# Patient Record
Sex: MALE | Race: WHITE | Employment: OTHER | URBAN - METROPOLITAN AREA
[De-identification: names, ages, dates, MRNs, and addresses within clinical notes are randomized per-mention and may not be internally consistent; named-entity substitution may affect disease eponyms.]

---

## 2017-10-19 DIAGNOSIS — I25.810 CORONARY ARTERY DISEASE INVOLVING CORONARY BYPASS GRAFT OF NATIVE HEART WITHOUT ANGINA PECTORIS: Primary | ICD-10-CM

## 2017-10-20 ENCOUNTER — OFFICE VISIT (OUTPATIENT)
Dept: CARDIOLOGY | Facility: CLINIC | Age: 67
End: 2017-10-20
Payer: COMMERCIAL

## 2017-10-20 VITALS
HEIGHT: 69 IN | HEART RATE: 64 BPM | DIASTOLIC BLOOD PRESSURE: 72 MMHG | WEIGHT: 218.7 LBS | BODY MASS INDEX: 32.39 KG/M2 | SYSTOLIC BLOOD PRESSURE: 114 MMHG

## 2017-10-20 DIAGNOSIS — E78.2 MIXED HYPERLIPIDEMIA: ICD-10-CM

## 2017-10-20 DIAGNOSIS — E78.5 HYPERLIPIDEMIA LDL GOAL <100: ICD-10-CM

## 2017-10-20 DIAGNOSIS — Z95.1 S/P CABG (CORONARY ARTERY BYPASS GRAFT): ICD-10-CM

## 2017-10-20 DIAGNOSIS — I25.10 CORONARY ARTERY DISEASE INVOLVING NATIVE CORONARY ARTERY OF NATIVE HEART WITHOUT ANGINA PECTORIS: Primary | ICD-10-CM

## 2017-10-20 DIAGNOSIS — I25.10 CAD (CORONARY ARTERY DISEASE): ICD-10-CM

## 2017-10-20 PROCEDURE — 93000 ELECTROCARDIOGRAM COMPLETE: CPT | Performed by: INTERNAL MEDICINE

## 2017-10-20 PROCEDURE — 99213 OFFICE O/P EST LOW 20 MIN: CPT | Performed by: INTERNAL MEDICINE

## 2017-10-20 RX ORDER — ROSUVASTATIN CALCIUM 40 MG/1
40 TABLET, COATED ORAL DAILY
Qty: 90 TABLET | Refills: 3 | Status: SHIPPED | OUTPATIENT
Start: 2017-10-20 | End: 2018-11-30

## 2017-10-20 RX ORDER — METOPROLOL SUCCINATE 25 MG/1
25 TABLET, EXTENDED RELEASE ORAL 2 TIMES DAILY
Qty: 180 TABLET | Refills: 3 | Status: SHIPPED | OUTPATIENT
Start: 2017-10-20 | End: 2018-11-30

## 2017-10-20 NOTE — MR AVS SNAPSHOT
After Visit Summary   10/20/2017    Maico Smith    MRN: [id number]           Patient Information     Date Of Birth          1950        Visit Information        Provider Department      10/20/2017 9:15 AM Zachery Damon MD [facility] [place]        Today's Diagnoses     Coronary artery disease involving native coronary artery of native heart without angina pectoris    -  1    Hyperlipidemia LDL goal <100        Mixed hyperlipidemia        S/P CABG (coronary artery bypass graft)           Follow-ups after your visit        Additional Services     Follow-Up with Cardiologist                 Your next 10 appointments already scheduled     Oct 20, 2017 11:30 AM CDT   Ech Complete with [id number]   [facility] CV Echocardiography (Cardiovascular Imaging at [facility])    [address] [place] [id number] [phone]           1. Please bring or wear a comfortable two-piece outfit. 2. You may eat, drink and take your normal medicines. 3. For any questions that cannot be answered, please contact the ordering physician              Future tests that were ordered for you today     Open Future Orders        Priority Expected Expires Ordered    Follow-Up with Cardiologist Routine 10/20/2018 10/21/2018 10/20/2017    NM Exercise stress test (nuc card) Routine 10/27/2017 10/20/2018 10/20/2017    Echocardiogram Routine 10/26/2017 10/19/2018 10/19/2017            Who to contact     If you have questions or need follow up information about today's clinic visit or your schedule please contact [facility] [place] directly at [phone].  Normal or non-critical lab and imaging results will be communicated to you by MyChart, letter or phone within 4 business days after the clinic has received the results. If you do not hear from us within 7 days, please contact the clinic through MyChart or phone. If  "you have a critical or abnormal lab result, we will notify you by phone as soon as possible.  Submit refill requests through [employer] or call your pharmacy and they will forward the refill request to us. Please allow 3 business days for your refill to be completed.          Additional Information About Your Visit        [employer] Information     [employer] gives you secure access to your electronic health record. If you see a primary care provider, you can also send messages to your care team and make appointments. If you have questions, please call your primary care clinic.  If you do not have a primary care provider, please call [phone] and they will assist you.        Care EveryWhere ID     This is your Care EveryWhere ID. This could be used by other organizations to access your [place] medical records  [id number]        Your Vitals Were     Pulse Height BMI (Body Mass Index)             64 1.753 m (5' 9\") 32.3 kg/m2          Blood Pressure from Last 3 Encounters:   10/20/17 114/72   03/16/15 116/68   09/25/14 123/85    Weight from Last 3 Encounters:   10/20/17 99.2 kg (218 lb 11.2 oz)   03/16/15 97.3 kg (214 lb 9.6 oz)   09/25/14 99.8 kg (220 lb)              We Performed the Following     EKG 12-lead complete w/read - Clinics (performed today)          Today's Medication Changes          These changes are accurate as of: 10/20/17  9:55 AM.  If you have any questions, ask your nurse or doctor.               Stop taking these medicines if you haven't already. Please contact your care team if you have questions.     ezetimibe 10 MG tablet   Commonly known as:  ZETIA   Stopped by:  Zachery Damon MD                Where to get your medicines      These medications were sent to [facility] - [facility], [place] - [address]  [address], [facility] [address]     Phone:  [phone]     rosuvastatin 40 MG tablet                Primary Care Provider    Physician No Ref-Primary "       NO REF-PRIMARY PHYSICIAN        Equal Access to Services     EMERY OLIVARES : Hadii aad ku hadkaveho Somarlon, waaxda luqadaha, qaybta kaalmada shannon, nahomy bello. So [facility] [phone].    ATENCIÓN: Si habla español, tiene a gardiner disposición servicios gratuitos de asistencia lingüística. Jose al [phone].    We comply with applicable federal civil rights laws and [place] laws. We do not discriminate on the basis of race, color, national origin, age, disability, sex, sexual orientation, or gender identity.            Thank you!     Thank you for choosing [facility] AT [place]  for your care. Our goal is always to provide you with excellent care. Hearing back from our patients is one way we can continue to improve our services. Please take a few minutes to complete the written survey that you may receive in the mail after your visit with us. Thank you!             Your Updated Medication List - Protect others around you: Learn how to safely use, store and throw away your medicines at www.disposemymeds.org.          This list is accurate as of: 10/20/17  9:55 AM.  Always use your most recent med list.                   Brand Name Dispense Instructions for use Diagnosis    aspirin 81 MG tablet      1 TABLET DAILY    Other and unspecified hyperlipidemia, NSTEMI (non-ST elevated myocardial infarction) (H)       CALCIUM 500/D PO      Calcium magnesium zinc tablet, one daily    Other and unspecified hyperlipidemia, NSTEMI (non-ST elevated myocardial infarction) (H)       fish Oil 1200 MG capsule      2 tablets daily    Other and unspecified hyperlipidemia, NSTEMI (non-ST elevated myocardial infarction) (H)       ibuprofen 600 MG tablet    ADVIL/MOTRIN     1 TABLET 4 TIMES DAILY AS NEEDED    Other and unspecified hyperlipidemia, NSTEMI (non-ST elevated myocardial infarction) (H)       metoprolol 25 MG 24 hr tablet    TOPROL-XL    180 tablet    Take 1  tablet (25 mg) by mouth 2 times daily    CAD (coronary artery disease)       nitroGLYcerin 0.4 MG sublingual tablet    NITROSTAT    90 tablet    Take 1 tablet by mouth every 5 minutes as needed. up to 3 tablets per episode.    NSTEMI (non-ST elevated myocardial infarction) (H)       rosuvastatin 40 MG tablet    CRESTOR    90 tablet    Take 1 tablet (40 mg) by mouth daily    Coronary artery disease involving native coronary artery of native heart without angina pectoris       vitamin D 2000 UNITS Caps      Take  by mouth daily.    Other and unspecified hyperlipidemia, NSTEMI (non-ST elevated myocardial infarction) (H)       VITAMIN-B COMPLEX PO      Take  by mouth. 1 tablet daily    Routine general medical examination at a health care facility

## 2017-10-20 NOTE — PROGRESS NOTES
HPI and Plan:        I had the pleasure of seeing Maico Smith in Cardiology Clinic today, a 64-year-old male with past medical history of coronary disease with previous bypass surgery in 2009 who returns for a followup.  He had LIMA to the LAD, vein graft to the posterior descending artery and vein graft to the obtuse marginal branch.  He has done really well.  Denies any chest pain or shortness of breath.    His last stress test in 2014 showed no ischemia.  He moved to [place], and has not seen me since 2015. He is now on Medicare and is able to see me again and therefore wanted to establish care again.    He remains active in [place] but has had no chest pain. He did have an echocardiogram last year in [place] in 2016. I was not able to get the report to get everywhere. He tells me that the echocardiogram results were fine and there was discussed by his primary care physician with him. We'll try to open the results.    He used to be an rosuvastatin and Zetia but has discontinued both of them. Rosuvastatin was expensive as he was not able to get a non-generic brand and May. He wants to get back on rosuvastatin now and every feels that prescription today. I also asked him to see his primary care physician for lipid profile in December. If LDL is not adequately lowered by rosuvastatin, we may add Zetia.      PHYSICAL EXAMINATION:   VITAL SIGNS:  Blood pressure 114/72, pulse 64 per minute and regular.   CARDIAC:  Regular S1, S2 with no murmurs.   CHEST:  Clear to auscultation.       IMPRESSION:   1.  Coronary artery disease, stable with no angina.  I would continue aspirin, metoprolol XL 25 mg b.i.d. and some nitro on a p.r.n. basis.  I have encouraged him to continue exercise as able.     2.  Hyperlipidemia   Presuming rosuvastatin again. Side effects were discussed. He will make an appointment with his primary care physician in 2 months for repeat lipid profile and other labs.      Thank you for allowing  us to participate in the care of this nice patient.  We will see him back in followup in a year. I've recommended an exercise stress nuclear study on medications next year to assess ischemic burden of the myocardium.      Sincerely,           Zachery Damon MD         Orders Placed This Encounter   Procedures     NM Exercise stress test (nuc card)     Follow-Up with Cardiologist     EKG 12-lead complete w/read - Clinics (performed today)       Orders Placed This Encounter   Medications     rosuvastatin (CRESTOR) 40 MG tablet     Sig: Take 1 tablet (40 mg) by mouth daily     Dispense:  90 tablet     Refill:  3       Medications Discontinued During This Encounter   Medication Reason     ezetimibe (ZETIA) 10 MG tablet Cost/Formulary change     MULTIVITAMIN TABS   OR Stopped by Patient     rosuvastatin (CRESTOR) 40 MG tablet Reorder         Encounter Diagnoses   Name Primary?     Hyperlipidemia LDL goal <100      Coronary artery disease involving native coronary artery of native heart without angina pectoris Yes     Mixed hyperlipidemia      S/P CABG (coronary artery bypass graft)        CURRENT MEDICATIONS:  Current Outpatient Prescriptions   Medication Sig Dispense Refill     rosuvastatin (CRESTOR) 40 MG tablet Take 1 tablet (40 mg) by mouth daily 90 tablet 3     metoprolol (TOPROL-XL) 25 MG 24 hr tablet Take 1 tablet (25 mg) by mouth 2 times daily 180 tablet 3     nitroglycerin (NITROSTAT) 0.4 MG SL tablet Take 1 tablet by mouth every 5 minutes as needed. up to 3 tablets per episode. 90 tablet prn     B Complex Vitamins (VITAMIN-B COMPLEX PO) Take  by mouth. 1 tablet daily        FISH OIL 1200 MG OR CAPS 2 tablets daily       CALCIUM 500/D OR Calcium magnesium zinc tablet, one daily       Cholecalciferol (VITAMIN D) 2000 UNIT CAPS Take  by mouth daily.       ASPIRIN 81 MG OR TABS 1 TABLET DAILY       IBUPROFEN 600 MG OR TABS 1 TABLET 4 TIMES DAILY AS NEEDED       [DISCONTINUED] rosuvastatin (CRESTOR) 40 MG tablet Take  1 tablet (40 mg) by mouth daily 90 tablet 3       ALLERGIES   No Known Allergies    PAST MEDICAL HISTORY:  Past Medical History:   Diagnosis Date     Abnormal angiogram 2009     CAD (coronary artery disease) 2009     Hyperlipaemia 2009     Myocardial infarct 2009     Pleural effusion 2009    left side -drained     S/P CABG (coronary artery bypass graft) 2009       PAST SURGICAL HISTORY:  Past Surgical History:   Procedure Laterality Date     CORONARY ANGIOGRAPHY ADULT ORDER  9/14/09    Critical left main lesion with a thrombus sitting in the bifurcation of the LAD and the first septal .  Recommend  bypass grafting to the LAD, the left circumflex, and the PDA vessel.      CORONARY ARTERY BYPASS  2009     LIMA to the LAD and vein graft to the posterior descending artery and obtuse marginal branch       FAMILY HISTORY:  Family History   Problem Relation Age of Onset     Hypertension Mother      C.A.D. Mother      angioplasty     Cancer - colorectal Father      C.A.D. Father      CANCER Father      Bladder       SOCIAL HISTORY:  Social History     Social History     Marital status:      Spouse name: N/A     Number of children: N/A     Years of education: N/A     Social History Main Topics     Smoking status: Never Smoker     Smokeless tobacco: Never Used     Alcohol use Yes      Comment: 2 drinks week     Drug use: No     Sexual activity: Yes     Partners: Female     Other Topics Concern      Service No     Blood Transfusions No     Caffeine Concern No     Occupational Exposure No     Hobby Hazards No     Sleep Concern No     Stress Concern No     Weight Concern Yes     weight gain     Special Diet No     Back Care Yes     Exercise Yes     Walking 3-4x/week     Bike Helmet Not Asked     N/A     Seat Belt Yes     Parent/Sibling W/ Cabg, Mi Or Angioplasty Before 65f 55m? No     Social History Narrative       Review of Systems:  Skin:          Eyes:  Positive for glasses    ENT:  Negative     "  Respiratory:  Positive for sleep apnea waiting for new CPAP machine   Cardiovascular:  Negative;palpitations;chest pain;syncope or near-syncope;cyanosis;lightheadedness;dizziness;fatigue;edema;exercise intolerance      Gastroenterology: Negative      Genitourinary:  Negative      Musculoskeletal:  Negative      Neurologic:  Negative      Psychiatric:  Positive for sleep disturbances    Heme/Lymph/Imm:  Negative      Endocrine:  Negative        Physical Exam:  Vitals: /72 (BP Location: Left arm, Cuff Size: Adult Large)  Pulse 64  Ht 1.753 m (5' 9\")  Wt 99.2 kg (218 lb 11.2 oz)  BMI 32.3 kg/m2    Constitutional:  cooperative;alert and oriented        Skin:  warm and dry to the touch        Head:  no masses or lesions        Eyes:           ENT:  dentition good        Neck:  carotid pulses are full and equal bilaterally;JVP normal        Chest:  clear to auscultation          Cardiac: regular rhythm;normal S1 and S2     no presence of murmur            Abdomen:  abdomen soft;BS normoactive        Vascular: not assessed this visit                                        Extremities and Back:  no edema              Neurological:  no gross motor deficits              CC  No referring provider defined for this encounter.              "

## 2017-12-08 ENCOUNTER — TRANSFERRED RECORDS (OUTPATIENT)
Dept: HEALTH INFORMATION MANAGEMENT | Facility: CLINIC | Age: 67
End: 2017-12-08

## 2017-12-08 LAB
ALBUMIN SERPL-MCNC: 3.9 G/DL
ALP SERPL-CCNC: 71 U/L
ALT SERPL-CCNC: 20 U/L
ANION GAP SERPL CALCULATED.3IONS-SCNC: 7 MMOL/L
AST SERPL-CCNC: 24 U/L
BILIRUB SERPL-MCNC: 1.2 MG/DL
BUN SERPL-MCNC: 19 MG/DL
CALCIUM SERPL-MCNC: 9.3 MG/DL
CHLORIDE SERPLBLD-SCNC: 105 MMOL/L
CHOL/HDLC RATIO - QUEST: 3
CHOLEST SERPL-MCNC: 165 MG/DL
CO2 SERPL-SCNC: 26 MMOL/L
CREAT SERPL-MCNC: 1.11 MG/DL
GFR SERPL CREATININE-BSD FRML MDRD: 69 ML/MIN/1.73M2
GLUCOSE SERPL-MCNC: 95 MG/DL (ref 70–99)
HDLC SERPL-MCNC: 55 MG/DL
LDLC SERPL CALC-MCNC: 99 MG/DL
NONHDLC SERPL-MCNC: 110 MG/DL
POTASSIUM SERPL-SCNC: 3.8 MMOL/L
PROT SERPL-MCNC: 7 G/DL
SODIUM SERPL-SCNC: 138 MMOL/L
TRIGL SERPL-MCNC: 57 MG/DL

## 2017-12-28 DIAGNOSIS — E78.2 MIXED HYPERLIPIDEMIA: Primary | ICD-10-CM

## 2018-11-12 ENCOUNTER — HOSPITAL ENCOUNTER (OUTPATIENT)
Dept: CARDIOLOGY | Facility: CLINIC | Age: 68
Discharge: HOME | End: 2018-11-12
Attending: INTERNAL MEDICINE | Admitting: INTERNAL MEDICINE
Payer: COMMERCIAL

## 2018-11-12 DIAGNOSIS — I25.10 CORONARY ARTERY DISEASE INVOLVING NATIVE CORONARY ARTERY OF NATIVE HEART WITHOUT ANGINA PECTORIS: ICD-10-CM

## 2018-11-12 PROCEDURE — A9502 TC99M TETROFOSMIN: HCPCS | Performed by: INTERNAL MEDICINE

## 2018-11-12 PROCEDURE — 93018 CV STRESS TEST I&R ONLY: CPT | Performed by: INTERNAL MEDICINE

## 2018-11-12 PROCEDURE — 78452 HT MUSCLE IMAGE SPECT MULT: CPT | Mod: 26 | Performed by: INTERNAL MEDICINE

## 2018-11-12 PROCEDURE — 78452 HT MUSCLE IMAGE SPECT MULT: CPT

## 2018-11-12 PROCEDURE — 34300033 ZZH RX 343: Performed by: INTERNAL MEDICINE

## 2018-11-12 PROCEDURE — 93016 CV STRESS TEST SUPVJ ONLY: CPT | Performed by: INTERNAL MEDICINE

## 2018-11-12 RX ADMIN — TETROFOSMIN 3.93 MCI.: 1.38 INJECTION, POWDER, LYOPHILIZED, FOR SOLUTION INTRAVENOUS at 08:58

## 2018-11-12 RX ADMIN — TETROFOSMIN 12.56 MCI.: 1.38 INJECTION, POWDER, LYOPHILIZED, FOR SOLUTION INTRAVENOUS at 10:17

## 2018-11-14 ENCOUNTER — TELEPHONE (OUTPATIENT)
Dept: CARDIOLOGY | Facility: CLINIC | Age: 68
End: 2018-11-14

## 2018-11-14 NOTE — TELEPHONE ENCOUNTER
Pt has OV 11/30/18. Nuc shows changes:    Impression  1.  Myocardial perfusion imaging using single isotope technique  demonstrated the small area of mild fixed lateral count depression,  probably due to nontransmural infarction without residual ischemia.   2. Gated images demonstrated mild hypokinesis of the mid lateral wall.   The left ventricular systolic function is low normal, with an  ejection fraction measured at 52%.  3. Compared to the prior study from 1/23/2014, the small fixed lateral  defect was not previously reported. The ejection fraction is decreased  slightly from 60% on the previous study to 52% on the current study . Will message  For recommendations

## 2018-11-15 NOTE — TELEPHONE ENCOUNTER
Pt will get blood work done in [facility] and send before up[coming OV. Pt informed no new orders. SANTIAGO Gregg RN

## 2018-11-16 DIAGNOSIS — I25.10 CAD (CORONARY ARTERY DISEASE): Primary | ICD-10-CM

## 2018-11-30 ENCOUNTER — OFFICE VISIT (OUTPATIENT)
Dept: CARDIOLOGY | Facility: CLINIC | Age: 68
End: 2018-11-30
Payer: COMMERCIAL

## 2018-11-30 VITALS
BODY MASS INDEX: 32.79 KG/M2 | DIASTOLIC BLOOD PRESSURE: 80 MMHG | HEART RATE: 56 BPM | HEIGHT: 69 IN | SYSTOLIC BLOOD PRESSURE: 122 MMHG | WEIGHT: 221.4 LBS

## 2018-11-30 DIAGNOSIS — Z95.1 S/P CABG (CORONARY ARTERY BYPASS GRAFT): ICD-10-CM

## 2018-11-30 DIAGNOSIS — I25.10 CAD (CORONARY ARTERY DISEASE): ICD-10-CM

## 2018-11-30 DIAGNOSIS — E78.5 HYPERLIPIDEMIA LDL GOAL <100: ICD-10-CM

## 2018-11-30 DIAGNOSIS — I25.10 CORONARY ARTERY DISEASE INVOLVING NATIVE CORONARY ARTERY OF NATIVE HEART WITHOUT ANGINA PECTORIS: Primary | ICD-10-CM

## 2018-11-30 LAB
ALT SERPL W P-5'-P-CCNC: <5 U/L (ref 5–30)
CHOLEST SERPL-MCNC: 175 MG/DL
HDLC SERPL-MCNC: 61 MG/DL
LDLC SERPL CALC-MCNC: 99 MG/DL
NONHDLC SERPL-MCNC: 114 MG/DL
TRIGL SERPL-MCNC: 76 MG/DL

## 2018-11-30 PROCEDURE — 36415 COLL VENOUS BLD VENIPUNCTURE: CPT | Performed by: INTERNAL MEDICINE

## 2018-11-30 PROCEDURE — 80061 LIPID PANEL: CPT | Performed by: INTERNAL MEDICINE

## 2018-11-30 PROCEDURE — 99214 OFFICE O/P EST MOD 30 MIN: CPT | Performed by: INTERNAL MEDICINE

## 2018-11-30 PROCEDURE — 84460 ALANINE AMINO (ALT) (SGPT): CPT | Performed by: INTERNAL MEDICINE

## 2018-11-30 RX ORDER — ROSUVASTATIN CALCIUM 40 MG/1
40 TABLET, COATED ORAL DAILY
Qty: 90 TABLET | Refills: 3 | Status: SHIPPED | OUTPATIENT
Start: 2018-11-30 | End: 2021-09-23

## 2018-11-30 RX ORDER — METOPROLOL SUCCINATE 25 MG/1
25 TABLET, EXTENDED RELEASE ORAL 2 TIMES DAILY
Qty: 180 TABLET | Refills: 3 | Status: SHIPPED | OUTPATIENT
Start: 2018-11-30 | End: 2021-09-23

## 2018-11-30 RX ORDER — EZETIMIBE 10 MG/1
10 TABLET ORAL DAILY
Qty: 90 TABLET | Refills: 3 | Status: SHIPPED | OUTPATIENT
Start: 2018-11-30 | End: 2019-12-04

## 2018-11-30 RX ORDER — NITROGLYCERIN 0.4 MG/1
0.4 TABLET SUBLINGUAL EVERY 5 MIN PRN
Qty: 25 TABLET | Status: SHIPPED | OUTPATIENT
Start: 2018-11-30 | End: 2020-02-10

## 2018-11-30 NOTE — MR AVS SNAPSHOT
After Visit Summary   11/30/2018    Maico Smith    MRN: [id number]           Patient Information     Date Of Birth          1950        Visit Information        Provider Department      11/30/2018 8:45 AM Zachery Damon MD [facility]        Today's Diagnoses     Coronary artery disease involving native coronary artery of native heart without angina pectoris    -  1    Hyperlipidemia LDL goal <100        S/P CABG (coronary artery bypass graft)           Follow-ups after your visit        Additional Services     Follow-Up with Cardiologist                 Future tests that were ordered for you today     Open Future Orders        Priority Expected Expires Ordered    Lipid Profile Routine 2/28/2019 11/30/2019 11/30/2018    ALT Routine 2/28/2019 11/30/2019 11/30/2018    Follow-Up with Cardiologist Routine 11/30/2019 11/30/2019 11/30/2018    Echocardiogram Routine 11/30/2019 11/30/2019 11/30/2018            Who to contact     If you have questions or need follow up information about today's clinic visit or your schedule please contact [facility] directly at [phone].  Normal or non-critical lab and imaging results will be communicated to you by [employer], letter or phone within 4 business days after the clinic has received the results. If you do not hear from us within 7 days, please contact the clinic through [employer] or phone. If you have a critical or abnormal lab result, we will notify you by phone as soon as possible.  Submit refill requests through [employer] or call your pharmacy and they will forward the refill request to us. Please allow 3 business days for your refill to be completed.          Additional Information About Your Visit        MyChart Information     [employer] gives you secure access to your electronic health record. If you see a primary care provider, you can also send messages to your care team and  "make appointments. If you have questions, please call your primary care clinic.  If you do not have a primary care provider, please call [phone] and they will assist you.        Care EveryWhere ID     This is your Care EveryWhere ID. This could be used by other organizations to access your [place] medical records  [id number]        Your Vitals Were     Pulse Height BMI (Body Mass Index)             56 1.753 m (5' 9\") 32.7 kg/m2          Blood Pressure from Last 3 Encounters:   11/30/18 122/80   10/20/17 114/72   03/16/15 116/68    Weight from Last 3 Encounters:   11/30/18 100.4 kg (221 lb 6.4 oz)   10/20/17 99.2 kg (218 lb 11.2 oz)   03/16/15 97.3 kg (214 lb 9.6 oz)                 Today's Medication Changes          These changes are accurate as of 11/30/18  9:05 AM.  If you have any questions, ask your nurse or doctor.               Start taking these medicines.        Dose/Directions    ezetimibe 10 MG tablet   Commonly known as:  ZETIA   Used for:  Hyperlipidemia LDL goal <100   Started by:  Zachery Damon MD        Dose:  10 mg   Take 1 tablet (10 mg) by mouth daily   Quantity:  90 tablet   Refills:  3         These medicines have changed or have updated prescriptions.        Dose/Directions    nitroGLYcerin 0.4 MG sublingual tablet   Commonly known as:  NITROSTAT   This may have changed:    - how to take this  - reasons to take this   Changed by:  Zachery Damon MD        Dose:  0.4 mg   Place 1 tablet (0.4 mg) under the tongue every 5 minutes as needed for chest pain up to 3 tablets per episode.   Quantity:  25 tablet   Refills:  prn            Where to get your medicines      These medications were sent to [facility]  for [place], [place] - [address]  [address] [address]     Phone:  [phone]     ezetimibe 10 MG tablet    metoprolol succinate ER 25 MG 24 hr tablet    nitroGLYcerin 0.4 MG sublingual tablet    rosuvastatin 40 MG tablet "                Primary Care Provider Fax #    Provider Not In System [phone]                Equal Access to Services     PATRICEEVITA MARSHALL : Hadsaurabh Vegas, gonzalezda tiffanie, dipakzach lockettlunakarrie ortega, nahomy palmerdavidkerri jacobs . So [facility] [phone].    ATENCIÓN: Si habla español, tiene a gardiner disposición servicios gratuitos de asistencia lingüística. Llame al [phone].    We comply with applicable federal civil rights laws and [place] laws. We do not discriminate on the basis of race, color, national origin, age, disability, sex, sexual orientation, or gender identity.            Thank you!     Thank you for choosing [facility]  for your care. Our goal is always to provide you with excellent care. Hearing back from our patients is one way we can continue to improve our services. Please take a few minutes to complete the written survey that you may receive in the mail after your visit with us. Thank you!             Your Updated Medication List - Protect others around you: Learn how to safely use, store and throw away your medicines at www.disposemymeds.org.          This list is accurate as of 11/30/18  9:05 AM.  Always use your most recent med list.                   Brand Name Dispense Instructions for use Diagnosis    aspirin 81 MG tablet    ASA     1 TABLET DAILY    Other and unspecified hyperlipidemia, NSTEMI (non-ST elevated myocardial infarction) (H)       CALCIUM 500/D PO      Calcium magnesium zinc tablet, one daily    Other and unspecified hyperlipidemia, NSTEMI (non-ST elevated myocardial infarction) (H)       ezetimibe 10 MG tablet    ZETIA    90 tablet    Take 1 tablet (10 mg) by mouth daily    Hyperlipidemia LDL goal <100       fish Oil 1200 MG capsule      2 tablets daily    Other and unspecified hyperlipidemia, NSTEMI (non-ST elevated myocardial infarction) (H)       ibuprofen 600 MG tablet    ADVIL/MOTRIN     1 TABLET 4 TIMES  DAILY AS NEEDED    Other and unspecified hyperlipidemia, NSTEMI (non-ST elevated myocardial infarction) (H)       metoprolol succinate ER 25 MG 24 hr tablet    TOPROL-XL    180 tablet    Take 1 tablet (25 mg) by mouth 2 times daily    Coronary artery disease involving native coronary artery of native heart without angina pectoris       nitroGLYcerin 0.4 MG sublingual tablet    NITROSTAT    25 tablet    Place 1 tablet (0.4 mg) under the tongue every 5 minutes as needed for chest pain up to 3 tablets per episode.        rosuvastatin 40 MG tablet    CRESTOR    90 tablet    Take 1 tablet (40 mg) by mouth daily    Coronary artery disease involving native coronary artery of native heart without angina pectoris       vitamin D 2000 units Caps      Take  by mouth daily.    Other and unspecified hyperlipidemia, NSTEMI (non-ST elevated myocardial infarction) (H)       VITAMIN-B COMPLEX PO      Take  by mouth. 1 tablet daily    Routine general medical examination at a health care facility

## 2018-11-30 NOTE — PROGRESS NOTES
HPI and Plan:    had the pleasure of seeing Maico Smith in Cardiology Clinic today, a 64-year-old male with past medical history of coronary disease with previous bypass surgery in 2009 who returns for a followup.  He had LIMA to the LAD, vein graft to the posterior descending artery and vein graft to the obtuse marginal branch.  He has done really well.  Denies any chest pain or shortness of breath.       Today we discussed the results of his recent exercise stress nuclear study.  It showed no ischemia.  There was a small fixed lateral defect that could be nontransmural scar.  EF is low normal.  He had excellent exercise capacity.  There was no chest pain with exercise.    We reviewed his LDL which is 99.  Is same as before.  He is on Crestor 40 mg daily.  I have suggested adding Zetia again.  He has tried to lose weight for last few years but has not been successful.    PHYSICAL EXAMINATION:   See below  IMPRESSION:   1.  Coronary artery disease, stable with no angina.  I would continue aspirin, metoprolol XL 25 mg b.i.d. and some nitro on a p.r.n. basis.  I have encouraged him to continue exercise as able.  Stress nuclear study was reviewed with him.  I have given some nitro prescription for as needed use and refilled her metoprolol and rosuvastatin.     2.  Hyperlipidemia   We will add Zetia and rosuvastatin.  He wants to get his follow-up labs with his primary care physician which is reasonable.  I discussed the side effects that he had with him.        Thank you for allowing us to participate in the care of this nice patient.  We will see him back in followup in a year. I've recommended an echocardiogram to reassess wall motion ejection fraction    Sincerely,           Zachery Damon MD     Orders Placed This Encounter   Procedures     Lipid Profile     ALT     Follow-Up with Cardiologist     Echocardiogram       Orders Placed This Encounter   Medications     metoprolol succinate ER (TOPROL-XL) 25 MG 24 hr tablet      Sig: Take 1 tablet (25 mg) by mouth 2 times daily     Dispense:  180 tablet     Refill:  3     rosuvastatin (CRESTOR) 40 MG tablet     Sig: Take 1 tablet (40 mg) by mouth daily     Dispense:  90 tablet     Refill:  3     nitroGLYcerin (NITROSTAT) 0.4 MG sublingual tablet     Sig: Place 1 tablet (0.4 mg) under the tongue every 5 minutes as needed for chest pain up to 3 tablets per episode.     Dispense:  25 tablet     Refill:  prn     ezetimibe (ZETIA) 10 MG tablet     Sig: Take 1 tablet (10 mg) by mouth daily     Dispense:  90 tablet     Refill:  3       Medications Discontinued During This Encounter   Medication Reason     metoprolol (TOPROL-XL) 25 MG 24 hr tablet Reorder     rosuvastatin (CRESTOR) 40 MG tablet Reorder     nitroglycerin (NITROSTAT) 0.4 MG SL tablet Reorder         Encounter Diagnoses   Name Primary?     Coronary artery disease involving native coronary artery of native heart without angina pectoris Yes     Hyperlipidemia LDL goal <100      S/P CABG (coronary artery bypass graft)        CURRENT MEDICATIONS:  Current Outpatient Prescriptions   Medication Sig Dispense Refill     ASPIRIN 81 MG OR TABS 1 TABLET DAILY       B Complex Vitamins (VITAMIN-B COMPLEX PO) Take  by mouth. 1 tablet daily        CALCIUM 500/D OR Calcium magnesium zinc tablet, one daily       Cholecalciferol (VITAMIN D) 2000 UNIT CAPS Take  by mouth daily.       ezetimibe (ZETIA) 10 MG tablet Take 1 tablet (10 mg) by mouth daily 90 tablet 3     FISH OIL 1200 MG OR CAPS 2 tablets daily       IBUPROFEN 600 MG OR TABS 1 TABLET 4 TIMES DAILY AS NEEDED       metoprolol succinate ER (TOPROL-XL) 25 MG 24 hr tablet Take 1 tablet (25 mg) by mouth 2 times daily 180 tablet 3     nitroGLYcerin (NITROSTAT) 0.4 MG sublingual tablet Place 1 tablet (0.4 mg) under the tongue every 5 minutes as needed for chest pain up to 3 tablets per episode. 25 tablet prn     rosuvastatin (CRESTOR) 40 MG tablet Take 1 tablet (40 mg) by mouth daily 90 tablet 3      [DISCONTINUED] metoprolol (TOPROL-XL) 25 MG 24 hr tablet Take 1 tablet (25 mg) by mouth 2 times daily 180 tablet 3     [DISCONTINUED] nitroglycerin (NITROSTAT) 0.4 MG SL tablet Take 1 tablet by mouth every 5 minutes as needed. up to 3 tablets per episode. 90 tablet prn     [DISCONTINUED] rosuvastatin (CRESTOR) 40 MG tablet Take 1 tablet (40 mg) by mouth daily 90 tablet 3       ALLERGIES   No Known Allergies    PAST MEDICAL HISTORY:  Past Medical History:   Diagnosis Date     Abnormal angiogram 2009     CAD (coronary artery disease) 2009     Hyperlipaemia 2009     Myocardial infarct (H) 2009     Pleural effusion 2009    left side -drained     S/P CABG (coronary artery bypass graft) 2009       PAST SURGICAL HISTORY:  Past Surgical History:   Procedure Laterality Date     CORONARY ANGIOGRAPHY ADULT ORDER  9/14/09    Critical left main lesion with a thrombus sitting in the bifurcation of the LAD and the first septal .  Recommend  bypass grafting to the LAD, the left circumflex, and the PDA vessel.      CORONARY ARTERY BYPASS  2009     LIMA to the LAD and vein graft to the posterior descending artery and obtuse marginal branch       FAMILY HISTORY:  Family History   Problem Relation Age of Onset     Hypertension Mother      C.A.D. Mother      angioplasty     Cancer - colorectal Father      C.A.D. Father      Cancer Father      Bladder       SOCIAL HISTORY:  Social History     Social History     Marital status:      Spouse name: N/A     Number of children: N/A     Years of education: N/A     Social History Main Topics     Smoking status: Never Smoker     Smokeless tobacco: Never Used     Alcohol use Yes      Comment: 2 drinks week     Drug use: No     Sexual activity: Yes     Partners: Female     Other Topics Concern      Service No     Blood Transfusions No     Caffeine Concern No     Occupational Exposure No     Hobby Hazards No     Sleep Concern No     Stress Concern No     Weight Concern  "Yes     weight gain     Special Diet No     Back Care Yes     Exercise Yes     Walking 3-4x/week     Bike Helmet Not Asked     N/A     Seat Belt Yes     Parent/Sibling W/ Cabg, Mi Or Angioplasty Before 65f 55m? No     Social History Narrative       Review of Systems:  Skin:  Negative       Eyes:  Positive for glasses    ENT:  Negative      Respiratory:  Positive for sleep apnea;CPAP     Cardiovascular:  Negative;palpitations;chest pain;syncope or near-syncope;edema;cyanosis;exercise intolerance;fatigue;dizziness Positive for lightheadedness, occ. when standing up too fast  Gastroenterology: Negative      Genitourinary:  Negative      Musculoskeletal:  Negative      Neurologic:  Negative      Psychiatric:  Negative      Heme/Lymph/Imm:  Negative      Endocrine:  Negative        Physical Exam:  Vitals: /80 (BP Location: Left arm, Cuff Size: Adult Large)  Pulse 56  Ht 1.753 m (5' 9\")  Wt 100.4 kg (221 lb 6.4 oz)  BMI 32.7 kg/m2    Constitutional:  well developed        Skin:  warm and dry to the touch          Head:  no masses or lesions        Eyes:           Lymph:      ENT:  dentition good        Neck:  carotid pulses are full and equal bilaterally;JVP normal        Respiratory:  clear to auscultation         Cardiac: regular rhythm;normal S1 and S2     no presence of murmur          not assessed this visit                                        GI:  abdomen soft;BS normoactive        Extremities and Muscular Skeletal:  no edema              Neurological:  no gross motor deficits        Psych:           CC  No referring provider defined for this encounter.              "

## 2018-11-30 NOTE — LETTER
11/30/2018    Provider Not In System       RE: Maico Smith       Dear Colleague,    I had the pleasure of seeing Maico Smith in the [facility].    HPI and Plan:    had the pleasure of seeing Maico Smith in Cardiology Clinic today, a 64-year-old male with past medical history of coronary disease with previous bypass surgery in 2009 who returns for a followup.  He had LIMA to the LAD, vein graft to the posterior descending artery and vein graft to the obtuse marginal branch.  He has done really well.  Denies any chest pain or shortness of breath.       Today we discussed the results of his recent exercise stress nuclear study.  It showed no ischemia.  There was a small fixed lateral defect that could be nontransmural scar.  EF is low normal.  He had excellent exercise capacity.  There was no chest pain with exercise.    We reviewed his LDL which is 99.  Is same as before.  He is on Crestor 40 mg daily.  I have suggested adding Zetia again.  He has tried to lose weight for last few years but has not been successful.    PHYSICAL EXAMINATION:   See below  IMPRESSION:   1.  Coronary artery disease, stable with no angina.  I would continue aspirin, metoprolol XL 25 mg b.i.d. and some nitro on a p.r.n. basis.  I have encouraged him to continue exercise as able.  Stress nuclear study was reviewed with him.  I have given some nitro prescription for as needed use and refilled her metoprolol and rosuvastatin.     2.  Hyperlipidemia   We will add Zetia and rosuvastatin.  He wants to get his follow-up labs with his primary care physician which is reasonable.  I discussed the side effects that he had with him.        Thank you for allowing us to participate in the care of this nice patient.  We will see him back in followup in a year. I've recommended an echocardiogram to reassess wall motion ejection fraction    Sincerely,           Zachery Damon MD     Orders Placed This Encounter   Procedures      Lipid Profile     ALT     Follow-Up with Cardiologist     Echocardiogram       Orders Placed This Encounter   Medications     metoprolol succinate ER (TOPROL-XL) 25 MG 24 hr tablet     Sig: Take 1 tablet (25 mg) by mouth 2 times daily     Dispense:  180 tablet     Refill:  3     rosuvastatin (CRESTOR) 40 MG tablet     Sig: Take 1 tablet (40 mg) by mouth daily     Dispense:  90 tablet     Refill:  3     nitroGLYcerin (NITROSTAT) 0.4 MG sublingual tablet     Sig: Place 1 tablet (0.4 mg) under the tongue every 5 minutes as needed for chest pain up to 3 tablets per episode.     Dispense:  25 tablet     Refill:  prn     ezetimibe (ZETIA) 10 MG tablet     Sig: Take 1 tablet (10 mg) by mouth daily     Dispense:  90 tablet     Refill:  3       Medications Discontinued During This Encounter   Medication Reason     metoprolol (TOPROL-XL) 25 MG 24 hr tablet Reorder     rosuvastatin (CRESTOR) 40 MG tablet Reorder     nitroglycerin (NITROSTAT) 0.4 MG SL tablet Reorder         Encounter Diagnoses   Name Primary?     Coronary artery disease involving native coronary artery of native heart without angina pectoris Yes     Hyperlipidemia LDL goal <100      S/P CABG (coronary artery bypass graft)        CURRENT MEDICATIONS:  Current Outpatient Prescriptions   Medication Sig Dispense Refill     ASPIRIN 81 MG OR TABS 1 TABLET DAILY       B Complex Vitamins (VITAMIN-B COMPLEX PO) Take  by mouth. 1 tablet daily        CALCIUM 500/D OR Calcium magnesium zinc tablet, one daily       Cholecalciferol (VITAMIN D) 2000 UNIT CAPS Take  by mouth daily.       ezetimibe (ZETIA) 10 MG tablet Take 1 tablet (10 mg) by mouth daily 90 tablet 3     FISH OIL 1200 MG OR CAPS 2 tablets daily       IBUPROFEN 600 MG OR TABS 1 TABLET 4 TIMES DAILY AS NEEDED       metoprolol succinate ER (TOPROL-XL) 25 MG 24 hr tablet Take 1 tablet (25 mg) by mouth 2 times daily 180 tablet 3     nitroGLYcerin (NITROSTAT) 0.4 MG sublingual tablet Place 1 tablet (0.4 mg) under  the tongue every 5 minutes as needed for chest pain up to 3 tablets per episode. 25 tablet prn     rosuvastatin (CRESTOR) 40 MG tablet Take 1 tablet (40 mg) by mouth daily 90 tablet 3     [DISCONTINUED] metoprolol (TOPROL-XL) 25 MG 24 hr tablet Take 1 tablet (25 mg) by mouth 2 times daily 180 tablet 3     [DISCONTINUED] nitroglycerin (NITROSTAT) 0.4 MG SL tablet Take 1 tablet by mouth every 5 minutes as needed. up to 3 tablets per episode. 90 tablet prn     [DISCONTINUED] rosuvastatin (CRESTOR) 40 MG tablet Take 1 tablet (40 mg) by mouth daily 90 tablet 3       ALLERGIES   No Known Allergies    PAST MEDICAL HISTORY:  Past Medical History:   Diagnosis Date     Abnormal angiogram 2009     CAD (coronary artery disease) 2009     Hyperlipaemia 2009     Myocardial infarct (H) 2009     Pleural effusion 2009    left side -drained     S/P CABG (coronary artery bypass graft) 2009       PAST SURGICAL HISTORY:  Past Surgical History:   Procedure Laterality Date     CORONARY ANGIOGRAPHY ADULT ORDER  9/14/09    Critical left main lesion with a thrombus sitting in the bifurcation of the LAD and the first septal .  Recommend  bypass grafting to the LAD, the left circumflex, and the PDA vessel.      CORONARY ARTERY BYPASS  2009     LIMA to the LAD and vein graft to the posterior descending artery and obtuse marginal branch       FAMILY HISTORY:  Family History   Problem Relation Age of Onset     Hypertension Mother      C.A.D. Mother      angioplasty     Cancer - colorectal Father      C.A.D. Father      Cancer Father      Bladder       SOCIAL HISTORY:  Social History     Social History     Marital status:      Spouse name: N/A     Number of children: N/A     Years of education: N/A     Social History Main Topics     Smoking status: Never Smoker     Smokeless tobacco: Never Used     Alcohol use Yes      Comment: 2 drinks week     Drug use: No     Sexual activity: Yes     Partners: Female     Other Topics Concern      " Service No     Blood Transfusions No     Caffeine Concern No     Occupational Exposure No     Hobby Hazards No     Sleep Concern No     Stress Concern No     Weight Concern Yes     weight gain     Special Diet No     Back Care Yes     Exercise Yes     Walking 3-4x/week     Bike Helmet Not Asked     N/A     Seat Belt Yes     Parent/Sibling W/ Cabg, Mi Or Angioplasty Before 65f 55m? No     Social History Narrative       Review of Systems:  Skin:  Negative       Eyes:  Positive for glasses    ENT:  Negative      Respiratory:  Positive for sleep apnea;CPAP     Cardiovascular:  Negative;palpitations;chest pain;syncope or near-syncope;edema;cyanosis;exercise intolerance;fatigue;dizziness Positive for lightheadedness, occ. when standing up too fast  Gastroenterology: Negative      Genitourinary:  Negative      Musculoskeletal:  Negative      Neurologic:  Negative      Psychiatric:  Negative      Heme/Lymph/Imm:  Negative      Endocrine:  Negative        Physical Exam:  Vitals: /80 (BP Location: Left arm, Cuff Size: Adult Large)  Pulse 56  Ht 1.753 m (5' 9\")  Wt 100.4 kg (221 lb 6.4 oz)  BMI 32.7 kg/m2    Constitutional:  well developed        Skin:  warm and dry to the touch          Head:  no masses or lesions        Eyes:           Lymph:      ENT:  dentition good        Neck:  carotid pulses are full and equal bilaterally;JVP normal        Respiratory:  clear to auscultation         Cardiac: regular rhythm;normal S1 and S2     no presence of murmur          not assessed this visit                                        GI:  abdomen soft;BS normoactive        Extremities and Muscular Skeletal:  no edema              Neurological:  no gross motor deficits        Psych:           CC  No referring provider defined for this encounter.                Thank you for allowing me to participate in the care of your patient.      Sincerely,     Zachery Damon MD     [facility]    cc:   No referring provider defined for this encounter.

## 2019-04-29 ENCOUNTER — TRANSFERRED RECORDS (OUTPATIENT)
Dept: HEALTH INFORMATION MANAGEMENT | Facility: CLINIC | Age: 69
End: 2019-04-29

## 2019-04-29 LAB
CHOL/HDL RATIO - HISTORICAL: 3
CHOLEST SERPL-MCNC: 146 MG/DL
HDLC SERPL-MCNC: 52 MG/DL
LDLC SERPL CALC-MCNC: 83 MG/DL
NONHDLC SERPL-MCNC: 94 MG/DL
PSA SERPL-MCNC: 1.5 NG/ML
TRIGL SERPL-MCNC: 55 MG/DL

## 2019-05-03 ENCOUNTER — DOCUMENTATION ONLY (OUTPATIENT)
Dept: CARDIOLOGY | Facility: CLINIC | Age: 69
End: 2019-05-03

## 2019-11-11 ENCOUNTER — HOSPITAL ENCOUNTER (OUTPATIENT)
Dept: CARDIOLOGY | Facility: CLINIC | Age: 69
Discharge: HOME | End: 2019-11-11
Attending: INTERNAL MEDICINE | Admitting: INTERNAL MEDICINE
Payer: COMMERCIAL

## 2019-11-11 DIAGNOSIS — I25.10 CORONARY ARTERY DISEASE INVOLVING NATIVE CORONARY ARTERY OF NATIVE HEART WITHOUT ANGINA PECTORIS: ICD-10-CM

## 2019-11-11 PROCEDURE — 93306 TTE W/DOPPLER COMPLETE: CPT | Mod: 26 | Performed by: INTERNAL MEDICINE

## 2019-11-11 PROCEDURE — 40000264 ECHOCARDIOGRAM COMPLETE

## 2019-11-11 PROCEDURE — 25500064 ZZH RX 255 OP 636: Performed by: INTERNAL MEDICINE

## 2019-11-11 RX ADMIN — HUMAN ALBUMIN MICROSPHERES AND PERFLUTREN 6 ML: 10; .22 INJECTION, SOLUTION INTRAVENOUS at 13:13

## 2019-12-04 ENCOUNTER — MYC REFILL (OUTPATIENT)
Dept: CARDIOLOGY | Facility: CLINIC | Age: 69
End: 2019-12-04

## 2019-12-04 DIAGNOSIS — E78.5 HYPERLIPIDEMIA LDL GOAL <100: ICD-10-CM

## 2019-12-04 RX ORDER — EZETIMIBE 10 MG/1
10 TABLET ORAL DAILY
Qty: 90 TABLET | Refills: 0 | Status: SHIPPED | OUTPATIENT
Start: 2019-12-04 | End: 2021-09-23

## 2020-01-28 ENCOUNTER — TELEPHONE (OUTPATIENT)
Dept: CARDIOLOGY | Facility: CLINIC | Age: 70
End: 2020-01-28

## 2020-01-28 NOTE — TELEPHONE ENCOUNTER
Called Pt regarding recent request for refills. Pt has not been seen since 11/2018. Pt offered OV's with NP. Pt declined offer at present. Pt does see PMD regularly. Asked Pt to please contact PMD for refills being he is unable to come in to heart clinic for OV. Pt was previously informed of echo being stable. SANTIAGO Gregg RN

## 2020-02-10 DIAGNOSIS — R07.9 CHEST PAIN: Primary | ICD-10-CM

## 2020-02-10 RX ORDER — NITROGLYCERIN 0.4 MG/1
0.4 TABLET SUBLINGUAL EVERY 5 MIN PRN
Qty: 25 TABLET | Refills: 0 | Status: SHIPPED | OUTPATIENT
Start: 2020-02-10 | End: 2021-09-23

## 2020-02-23 ENCOUNTER — HEALTH MAINTENANCE LETTER (OUTPATIENT)
Age: 70
End: 2020-02-23

## 2020-07-09 ENCOUNTER — MYC MEDICAL ADVICE (OUTPATIENT)
Dept: CARDIOLOGY | Facility: CLINIC | Age: 70
End: 2020-07-09

## 2020-12-06 ENCOUNTER — HEALTH MAINTENANCE LETTER (OUTPATIENT)
Age: 70
End: 2020-12-06

## 2021-04-11 ENCOUNTER — HEALTH MAINTENANCE LETTER (OUTPATIENT)
Age: 71
End: 2021-04-11

## 2021-04-12 ENCOUNTER — MYC MEDICAL ADVICE (OUTPATIENT)
Dept: CARDIOLOGY | Facility: CLINIC | Age: 71
End: 2021-04-12

## 2021-07-21 ENCOUNTER — MYC MEDICAL ADVICE (OUTPATIENT)
Dept: CARDIOLOGY | Facility: CLINIC | Age: 71
End: 2021-07-21

## 2021-09-23 ENCOUNTER — OFFICE VISIT (OUTPATIENT)
Dept: CARDIOLOGY | Facility: CLINIC | Age: 71
End: 2021-09-23
Payer: COMMERCIAL

## 2021-09-23 VITALS
WEIGHT: 219.5 LBS | HEART RATE: 58 BPM | SYSTOLIC BLOOD PRESSURE: 135 MMHG | BODY MASS INDEX: 32.51 KG/M2 | HEIGHT: 69 IN | DIASTOLIC BLOOD PRESSURE: 76 MMHG

## 2021-09-23 DIAGNOSIS — E78.5 HYPERLIPIDEMIA LDL GOAL <100: ICD-10-CM

## 2021-09-23 DIAGNOSIS — I25.10 CORONARY ARTERY DISEASE INVOLVING NATIVE CORONARY ARTERY OF NATIVE HEART WITHOUT ANGINA PECTORIS: Primary | ICD-10-CM

## 2021-09-23 DIAGNOSIS — Z95.1 S/P CABG (CORONARY ARTERY BYPASS GRAFT): ICD-10-CM

## 2021-09-23 PROCEDURE — 99214 OFFICE O/P EST MOD 30 MIN: CPT | Performed by: INTERNAL MEDICINE

## 2021-09-23 RX ORDER — NITROGLYCERIN 0.4 MG/1
0.4 TABLET SUBLINGUAL EVERY 5 MIN PRN
Qty: 25 TABLET | Refills: 0 | Status: SHIPPED | OUTPATIENT
Start: 2021-09-23

## 2021-09-23 RX ORDER — EZETIMIBE 10 MG/1
10 TABLET ORAL DAILY
Qty: 90 TABLET | Refills: 3 | Status: SHIPPED | OUTPATIENT
Start: 2021-09-23 | End: 2022-10-13

## 2021-09-23 RX ORDER — METOPROLOL SUCCINATE 25 MG/1
25 TABLET, EXTENDED RELEASE ORAL 2 TIMES DAILY
Qty: 180 TABLET | Refills: 3 | Status: SHIPPED | OUTPATIENT
Start: 2021-09-23 | End: 2022-10-13

## 2021-09-23 RX ORDER — ROSUVASTATIN CALCIUM 40 MG/1
40 TABLET, COATED ORAL DAILY
Qty: 90 TABLET | Refills: 3 | Status: SHIPPED | OUTPATIENT
Start: 2021-09-23 | End: 2022-10-13

## 2021-09-23 ASSESSMENT — MIFFLIN-ST. JEOR: SCORE: 1751.03

## 2021-09-23 NOTE — PROGRESS NOTES
HPI and Plan:   I had the pleasure of seeing Maico Smith in Cardiology Clinic today, a 71-year-old male with past medical history of coronary disease with previous bypass surgery in 2009 who returns for a followup.  He had LIMA to the LAD, vein graft to the posterior descending artery and vein graft to the obtuse marginal branch.    He now travels from [place] where he lives.  He wanted to be close to his grandkids.  He is doing well over the last year.  There is no history of chest pain shortness of breath orthopnea or PND or edema feet.  He has gained some weight and is trying to lose it.  He wants to aim for 210 pounds first.  His labs in July with his primary care physician with LDL of 79.  HDL was normal.  ALT was slightly elevated at 37 similar to 36 last year.  He might have an element of fatty liver.     His echocardiogram in 2090 revealed normal ejection fraction.  His last stress test in 2018 revealed no significant ischemia.  Nontransmural scar in the lateral segment was suspected.     I did refill his cardiac meds today.      PHYSICAL EXAMINATION:   See below    IMPRESSION:   1.  Coronary artery disease, stable with no angina.  I would continue aspirin, metoprolol XL 25 mg b.i.d. and some nitro on a p.r.n. basis.  I have encouraged him to continue exercise as able.       2.  Hyperlipidemia   Continue rosuvastatin and Zetia.  LDL 79.  With weight loss that he is aiming for, LDL will improve to below 70.       Thank you for allowing us to participate in the care of this nice patient.  We will see him back in followup in a year.     Sincerely,           Zachery Damon MD        26 minutes spent on the date of the encounter doing chart review, patient visit and documentation   {Provider  Link to [facility] Help Grid :1        No orders of the defined types were placed in this encounter.    No orders of the defined types were placed in this encounter.    There are no discontinued medications.    No diagnosis  found.    CURRENT MEDICATIONS:  Current Outpatient Medications   Medication Sig Dispense Refill     ASPIRIN 81 MG OR TABS 1 TABLET DAILY       B Complex Vitamins (VITAMIN-B COMPLEX PO) Take  by mouth. 1 tablet daily        CALCIUM 500/D OR Calcium magnesium zinc tablet, one daily       Cholecalciferol (VITAMIN D) 2000 UNIT CAPS Take  by mouth daily.       ezetimibe (ZETIA) 10 MG tablet Take 1 tablet (10 mg) by mouth daily 90 tablet 0     FISH OIL 1200 MG OR CAPS 2 tablets daily       IBUPROFEN 600 MG OR TABS 1 TABLET 4 TIMES DAILY AS NEEDED       metoprolol succinate ER (TOPROL-XL) 25 MG 24 hr tablet Take 1 tablet (25 mg) by mouth 2 times daily 180 tablet 3     nitroGLYcerin (NITROSTAT) 0.4 MG sublingual tablet Place 1 tablet (0.4 mg) under the tongue every 5 minutes as needed for chest pain up to 3 tablets per episode. 25 tablet 0     rosuvastatin (CRESTOR) 40 MG tablet Take 1 tablet (40 mg) by mouth daily 90 tablet 3       ALLERGIES   No Known Allergies    PAST MEDICAL HISTORY:  Past Medical History:   Diagnosis Date     Abnormal angiogram 2009     CAD (coronary artery disease) 2009     Hyperlipaemia 2009     Myocardial infarct (H) 2009     Pleural effusion 2009    left side -drained     S/P CABG (coronary artery bypass graft) 2009       PAST SURGICAL HISTORY:  Past Surgical History:   Procedure Laterality Date     CORONARY ANGIOGRAPHY ADULT ORDER  9/14/09    Critical left main lesion with a thrombus sitting in the bifurcation of the LAD and the first septal .  Recommend  bypass grafting to the LAD, the left circumflex, and the PDA vessel.      CORONARY ARTERY BYPASS  2009     LIMA to the LAD and vein graft to the posterior descending artery and obtuse marginal branch       FAMILY HISTORY:  Family History   Problem Relation Age of Onset     Hypertension Mother      C.A.D. Mother         angioplasty     Cancer - colorectal Father      C.A.D. Father      Cancer Father         Bladder       SOCIAL  HISTORY:  Social History     Socioeconomic History     Marital status:      Spouse name: Not on file     Number of children: Not on file     Years of education: Not on file     Highest education level: Not on file   Occupational History     Not on file   Tobacco Use     Smoking status: Never Smoker     Smokeless tobacco: Never Used   Substance and Sexual Activity     Alcohol use: Yes     Comment: 2 drinks week     Drug use: No     Sexual activity: Yes     Partners: Female   Other Topics Concern      Service No     Blood Transfusions No     Caffeine Concern No     Occupational Exposure No     Hobby Hazards No     Sleep Concern No     Stress Concern No     Weight Concern Yes     Comment: weight gain     Special Diet No     Back Care Yes     Exercise Yes     Comment: Walking 3-4x/week     Bike Helmet Not Asked     Comment: N/A     Seat Belt Yes     Self-Exams Not Asked     Parent/sibling w/ CABG, MI or angioplasty before 65F 55M? No   Social History Narrative     Not on file     Social Determinants of Health     Financial Resource Strain:      Difficulty of Paying Living Expenses:    Food Insecurity:      Worried About Running Out of Food in the Last Year:      Ran Out of Food in the Last Year:    Transportation Needs:      Lack of Transportation (Medical):      Lack of Transportation (Non-Medical):    Physical Activity:      Days of Exercise per Week:      Minutes of Exercise per Session:    Stress:      Feeling of Stress :    Social Connections:      Frequency of Communication with Friends and Family:      Frequency of Social Gatherings with Friends and Family:      Attends [employer] Services:      Active Member of Clubs or Organizations:      Attends Club or Organization Meetings:      Marital Status:    Intimate Partner Violence:      Fear of Current or Ex-Partner:      Emotionally Abused:      Physically Abused:      Sexually Abused:        Review of Systems:  Skin:        Eyes:       ENT:        Respiratory:       Cardiovascular:       Gastroenterology:      Genitourinary:       Musculoskeletal:       Neurologic:       Psychiatric:       Heme/Lymph/Imm:       Endocrine:         Physical Exam:  Vitals: There were no vitals taken for this visit.    Constitutional:  well developed        Skin:  warm and dry to the touch          Head:  no masses or lesions        Eyes:           Lymph:      ENT:  dentition good        Neck:  JVP normal        Respiratory:  clear to auscultation         Cardiac: regular rhythm;normal S1 and S2     no presence of murmur          not assessed this visit                                        GI:  not assessed this visit        Extremities and Muscular Skeletal:  no edema              Neurological:  no gross motor deficits        Psych:         Recent Lab Results:  LIPID RESULTS:  Lab Results   Component Value Date    CHOL 146 04/29/2019    HDL 52 04/29/2019    LDL 83 04/29/2019    TRIG 55 04/29/2019    CHOLHDLRATIO 3 12/08/2017    CHOLHDLRATIO 2.6 03/16/2015       LIVER ENZYME RESULTS:  Lab Results   Component Value Date    AST 24 12/08/2017    ALT <5 (L) 11/30/2018       CBC RESULTS:  Lab Results   Component Value Date    WBC 7.0 07/18/2014    RBC 4.85 07/18/2014    HGB 15.5 07/18/2014    HCT 45.4 07/18/2014    MCV 94 07/18/2014    MCH 32.0 07/18/2014    MCHC 34.1 07/18/2014    RDW 12.8 07/18/2014     07/18/2014       BMP RESULTS:  Lab Results   Component Value Date     12/08/2017    POTASSIUM 3.8 12/08/2017    CHLORIDE 105 12/08/2017    CO2 26 12/08/2017    ANIONGAP 7 12/08/2017    GLC 95 12/08/2017    BUN 19 12/08/2017    CR 1.11 12/08/2017    GFRESTIMATED 69 12/08/2017    GFRESTBLACK 88 07/18/2014    IOANA 9.3 12/08/2017        A1C RESULTS:  Lab Results   Component Value Date    A1C 5.7 09/13/2009       INR RESULTS:  Lab Results   Component Value Date    INR 1.07 09/19/2009    INR 1.17 (H) 09/17/2009         CC  No referring provider defined for this  encounter.

## 2021-09-23 NOTE — LETTER
9/23/2021    Cuco Singh MD  [facility] [address] [facility] [address]    RE: Maico Smith       Dear Colleague,    I had the pleasure of seeing Maico Smith in the [facility].    HPI and Plan:   I had the pleasure of seeing Maico Smith in Cardiology Clinic today, a 71-year-old male with past medical history of coronary disease with previous bypass surgery in 2009 who returns for a followup.  He had LIMA to the LAD, vein graft to the posterior descending artery and vein graft to the obtuse marginal branch.    He now travels from [place] where he lives.  He wanted to be close to his grandkids.  He is doing well over the last year.  There is no history of chest pain shortness of breath orthopnea or PND or edema feet.  He has gained some weight and is trying to lose it.  He wants to aim for 210 pounds first.  His labs in July with his primary care physician with LDL of 79.  HDL was normal.  ALT was slightly elevated at 37 similar to 36 last year.  He might have an element of fatty liver.     His echocardiogram in 2090 revealed normal ejection fraction.  His last stress test in 2018 revealed no significant ischemia.  Nontransmural scar in the lateral segment was suspected.     I did refill his cardiac meds today.      PHYSICAL EXAMINATION:   See below    IMPRESSION:   1.  Coronary artery disease, stable with no angina.  I would continue aspirin, metoprolol XL 25 mg b.i.d. and some nitro on a p.r.n. basis.  I have encouraged him to continue exercise as able.       2.  Hyperlipidemia   Continue rosuvastatin and Zetia.  LDL 79.  With weight loss that he is aiming for, LDL will improve to below 70.       Thank you for allowing us to participate in the care of this nice patient.  We will see him back in followup in a year.     Sincerely,           Zachery Damon MD        26 minutes spent on the date of the encounter doing chart review,  patient visit and documentation   {Provider  Link to [facility] Help Grid :1        No orders of the defined types were placed in this encounter.    No orders of the defined types were placed in this encounter.    There are no discontinued medications.    No diagnosis found.    CURRENT MEDICATIONS:  Current Outpatient Medications   Medication Sig Dispense Refill     ASPIRIN 81 MG OR TABS 1 TABLET DAILY       B Complex Vitamins (VITAMIN-B COMPLEX PO) Take  by mouth. 1 tablet daily        CALCIUM 500/D OR Calcium magnesium zinc tablet, one daily       Cholecalciferol (VITAMIN D) 2000 UNIT CAPS Take  by mouth daily.       ezetimibe (ZETIA) 10 MG tablet Take 1 tablet (10 mg) by mouth daily 90 tablet 0     FISH OIL 1200 MG OR CAPS 2 tablets daily       IBUPROFEN 600 MG OR TABS 1 TABLET 4 TIMES DAILY AS NEEDED       metoprolol succinate ER (TOPROL-XL) 25 MG 24 hr tablet Take 1 tablet (25 mg) by mouth 2 times daily 180 tablet 3     nitroGLYcerin (NITROSTAT) 0.4 MG sublingual tablet Place 1 tablet (0.4 mg) under the tongue every 5 minutes as needed for chest pain up to 3 tablets per episode. 25 tablet 0     rosuvastatin (CRESTOR) 40 MG tablet Take 1 tablet (40 mg) by mouth daily 90 tablet 3       ALLERGIES   No Known Allergies    PAST MEDICAL HISTORY:  Past Medical History:   Diagnosis Date     Abnormal angiogram 2009     CAD (coronary artery disease) 2009     Hyperlipaemia 2009     Myocardial infarct (H) 2009     Pleural effusion 2009    left side -drained     S/P CABG (coronary artery bypass graft) 2009       PAST SURGICAL HISTORY:  Past Surgical History:   Procedure Laterality Date     CORONARY ANGIOGRAPHY ADULT ORDER  9/14/09    Critical left main lesion with a thrombus sitting in the bifurcation of the LAD and the first septal .  Recommend  bypass grafting to the LAD, the left circumflex, and the PDA vessel.      CORONARY ARTERY BYPASS  2009     LIMA to the LAD and vein graft to the posterior descending artery and  obtuse marginal branch       FAMILY HISTORY:  Family History   Problem Relation Age of Onset     Hypertension Mother      C.A.D. Mother         angioplasty     Cancer - colorectal Father      C.A.D. Father      Cancer Father         Bladder       SOCIAL HISTORY:  Social History     Socioeconomic History     Marital status:      Spouse name: Not on file     Number of children: Not on file     Years of education: Not on file     Highest education level: Not on file   Occupational History     Not on file   Tobacco Use     Smoking status: Never Smoker     Smokeless tobacco: Never Used   Substance and Sexual Activity     Alcohol use: Yes     Comment: 2 drinks week     Drug use: No     Sexual activity: Yes     Partners: Female   Other Topics Concern      Service No     Blood Transfusions No     Caffeine Concern No     Occupational Exposure No     Hobby Hazards No     Sleep Concern No     Stress Concern No     Weight Concern Yes     Comment: weight gain     Special Diet No     Back Care Yes     Exercise Yes     Comment: Walking 3-4x/week     Bike Helmet Not Asked     Comment: N/A     Seat Belt Yes     Self-Exams Not Asked     Parent/sibling w/ CABG, MI or angioplasty before 65F 55M? No   Social History Narrative     Not on file     Social Determinants of Health     Financial Resource Strain:      Difficulty of Paying Living Expenses:    Food Insecurity:      Worried About Running Out of Food in the Last Year:      Ran Out of Food in the Last Year:    Transportation Needs:      Lack of Transportation (Medical):      Lack of Transportation (Non-Medical):    Physical Activity:      Days of Exercise per Week:      Minutes of Exercise per Session:    Stress:      Feeling of Stress :    Social Connections:      Frequency of Communication with Friends and Family:      Frequency of Social Gatherings with Friends and Family:      Attends Muslim Services:      Active Member of Clubs or Organizations:      Attends  Club or Organization Meetings:      Marital Status:    Intimate Partner Violence:      Fear of Current or Ex-Partner:      Emotionally Abused:      Physically Abused:      Sexually Abused:        Review of Systems:  Skin:        Eyes:       ENT:       Respiratory:       Cardiovascular:       Gastroenterology:      Genitourinary:       Musculoskeletal:       Neurologic:       Psychiatric:       Heme/Lymph/Imm:       Endocrine:         Physical Exam:  Vitals: There were no vitals taken for this visit.    Constitutional:  well developed        Skin:  warm and dry to the touch          Head:  no masses or lesions        Eyes:           Lymph:      ENT:  dentition good        Neck:  JVP normal        Respiratory:  clear to auscultation         Cardiac: regular rhythm;normal S1 and S2     no presence of murmur          not assessed this visit                                        GI:  not assessed this visit        Extremities and Muscular Skeletal:  no edema              Neurological:  no gross motor deficits        Psych:         Recent Lab Results:  LIPID RESULTS:  Lab Results   Component Value Date    CHOL 146 04/29/2019    HDL 52 04/29/2019    LDL 83 04/29/2019    TRIG 55 04/29/2019    CHOLHDLRATIO 3 12/08/2017    CHOLHDLRATIO 2.6 03/16/2015       LIVER ENZYME RESULTS:  Lab Results   Component Value Date    AST 24 12/08/2017    ALT <5 (L) 11/30/2018       CBC RESULTS:  Lab Results   Component Value Date    WBC 7.0 07/18/2014    RBC 4.85 07/18/2014    HGB 15.5 07/18/2014    HCT 45.4 07/18/2014    MCV 94 07/18/2014    MCH 32.0 07/18/2014    MCHC 34.1 07/18/2014    RDW 12.8 07/18/2014     07/18/2014       BMP RESULTS:  Lab Results   Component Value Date     12/08/2017    POTASSIUM 3.8 12/08/2017    CHLORIDE 105 12/08/2017    CO2 26 12/08/2017    ANIONGAP 7 12/08/2017    GLC 95 12/08/2017    BUN 19 12/08/2017    CR 1.11 12/08/2017    GFRESTIMATED 69 12/08/2017    GFRESTBLACK 88 07/18/2014    IOANA 9.3  12/08/2017        A1C RESULTS:  Lab Results   Component Value Date    A1C 5.7 09/13/2009       INR RESULTS:  Lab Results   Component Value Date    INR 1.07 09/19/2009    INR 1.17 (H) 09/17/2009         CC  No referring provider defined for this encounter.                      Thank you for allowing me to participate in the care of your patient.      Sincerely,     Zachery Damon MD     [facility]  cc:   No referring provider defined for this encounter.

## 2021-09-25 ENCOUNTER — HEALTH MAINTENANCE LETTER (OUTPATIENT)
Age: 71
End: 2021-09-25

## 2022-05-07 ENCOUNTER — HEALTH MAINTENANCE LETTER (OUTPATIENT)
Age: 72
End: 2022-05-07

## 2022-07-15 ENCOUNTER — MYC MEDICAL ADVICE (OUTPATIENT)
Dept: CARDIOLOGY | Facility: CLINIC | Age: 72
End: 2022-07-15

## 2022-08-04 ENCOUNTER — MYC MEDICAL ADVICE (OUTPATIENT)
Dept: CARDIOLOGY | Facility: CLINIC | Age: 72
End: 2022-08-04

## 2022-08-04 DIAGNOSIS — Z95.1 S/P CABG (CORONARY ARTERY BYPASS GRAFT): Primary | ICD-10-CM

## 2022-08-04 NOTE — TELEPHONE ENCOUNTER
That he has history of coronary artery disease and bypass surgeon.  Stress test was in 2018, I would recommend a repeat Lexiscan stress nuclear study before seeing me in October to make sure there is no significant ischemia.

## 2022-10-04 ENCOUNTER — HOSPITAL ENCOUNTER (OUTPATIENT)
Dept: CARDIOLOGY | Facility: CLINIC | Age: 72
Discharge: HOME | End: 2022-10-04
Attending: INTERNAL MEDICINE
Payer: COMMERCIAL

## 2022-10-04 VITALS
HEART RATE: 72 BPM | BODY MASS INDEX: 31.84 KG/M2 | WEIGHT: 215 LBS | HEIGHT: 69 IN | SYSTOLIC BLOOD PRESSURE: 140 MMHG | OXYGEN SATURATION: 98 % | DIASTOLIC BLOOD PRESSURE: 70 MMHG

## 2022-10-04 VITALS — SYSTOLIC BLOOD PRESSURE: 122 MMHG | HEART RATE: 70 BPM | DIASTOLIC BLOOD PRESSURE: 76 MMHG

## 2022-10-04 DIAGNOSIS — Z95.1 S/P CABG (CORONARY ARTERY BYPASS GRAFT): ICD-10-CM

## 2022-10-04 LAB
CV BLOOD PRESSURE: 46 MMHG
CV STRESS MAX HR HE: 91
NUC STRESS EJECTION FRACTION: 61 %
RATE PRESSURE PRODUCT: NORMAL
STRESS ECHO BASELINE DIASTOLIC HE: 76
STRESS ECHO BASELINE HR: 70 BPM
STRESS ECHO BASELINE SYSTOLIC BP: 122
STRESS ECHO CALCULATED PERCENT HR: 61 %
STRESS ECHO LAST STRESS DIASTOLIC BP: 68
STRESS ECHO LAST STRESS SYSTOLIC BP: 158
STRESS ECHO TARGET HR: 150
STRESS/REST PERFUSION RATIO: 1.01

## 2022-10-04 PROCEDURE — 93017 CV STRESS TEST TRACING ONLY: CPT

## 2022-10-04 PROCEDURE — A9502 TC99M TETROFOSMIN: HCPCS | Performed by: INTERNAL MEDICINE

## 2022-10-04 PROCEDURE — 250N000011 HC RX IP 250 OP 636: Performed by: INTERNAL MEDICINE

## 2022-10-04 PROCEDURE — 343N000001 HC RX 343: Performed by: INTERNAL MEDICINE

## 2022-10-04 PROCEDURE — 93018 CV STRESS TEST I&R ONLY: CPT | Performed by: INTERNAL MEDICINE

## 2022-10-04 PROCEDURE — 93016 CV STRESS TEST SUPVJ ONLY: CPT | Performed by: INTERNAL MEDICINE

## 2022-10-04 PROCEDURE — 78452 HT MUSCLE IMAGE SPECT MULT: CPT | Mod: 26 | Performed by: INTERNAL MEDICINE

## 2022-10-04 RX ORDER — REGADENOSON 0.08 MG/ML
0.4 INJECTION, SOLUTION INTRAVENOUS ONCE
Status: COMPLETED | OUTPATIENT
Start: 2022-10-04 | End: 2022-10-04

## 2022-10-04 RX ORDER — CAFFEINE CITRATE 20 MG/ML
60 SOLUTION INTRAVENOUS
Status: DISCONTINUED | OUTPATIENT
Start: 2022-10-04 | End: 2022-10-05 | Stop reason: HOSPADM

## 2022-10-04 RX ORDER — AMINOPHYLLINE 25 MG/ML
50-100 INJECTION, SOLUTION INTRAVENOUS
Status: DISCONTINUED | OUTPATIENT
Start: 2022-10-04 | End: 2022-10-05 | Stop reason: HOSPADM

## 2022-10-04 RX ORDER — ALBUTEROL SULFATE 90 UG/1
2 AEROSOL, METERED RESPIRATORY (INHALATION) EVERY 5 MIN PRN
Status: DISCONTINUED | OUTPATIENT
Start: 2022-10-04 | End: 2022-10-05 | Stop reason: HOSPADM

## 2022-10-04 RX ORDER — ACYCLOVIR 200 MG/1
0-1 CAPSULE ORAL
Status: DISCONTINUED | OUTPATIENT
Start: 2022-10-04 | End: 2022-10-05 | Stop reason: HOSPADM

## 2022-10-04 RX ADMIN — TETROFOSMIN 3.59 MCI.: 1.38 INJECTION, POWDER, LYOPHILIZED, FOR SOLUTION INTRAVENOUS at 09:22

## 2022-10-04 RX ADMIN — REGADENOSON 0.4 MG: 0.08 INJECTION, SOLUTION INTRAVENOUS at 11:06

## 2022-10-04 RX ADMIN — TETROFOSMIN 11.65 MCI.: 1.38 INJECTION, POWDER, LYOPHILIZED, FOR SOLUTION INTRAVENOUS at 11:11

## 2022-10-13 ENCOUNTER — OFFICE VISIT (OUTPATIENT)
Dept: CARDIOLOGY | Facility: CLINIC | Age: 72
End: 2022-10-13
Payer: COMMERCIAL

## 2022-10-13 VITALS
WEIGHT: 211.9 LBS | BODY MASS INDEX: 31.39 KG/M2 | DIASTOLIC BLOOD PRESSURE: 84 MMHG | HEIGHT: 69 IN | HEART RATE: 64 BPM | SYSTOLIC BLOOD PRESSURE: 135 MMHG

## 2022-10-13 DIAGNOSIS — E78.5 HYPERLIPIDEMIA LDL GOAL <100: ICD-10-CM

## 2022-10-13 DIAGNOSIS — I25.10 CORONARY ARTERY DISEASE INVOLVING NATIVE CORONARY ARTERY OF NATIVE HEART WITHOUT ANGINA PECTORIS: ICD-10-CM

## 2022-10-13 PROCEDURE — 99214 OFFICE O/P EST MOD 30 MIN: CPT | Performed by: INTERNAL MEDICINE

## 2022-10-13 RX ORDER — ROSUVASTATIN CALCIUM 40 MG/1
40 TABLET, COATED ORAL DAILY
Qty: 90 TABLET | Refills: 3 | Status: SHIPPED | OUTPATIENT
Start: 2022-10-13 | End: 2023-11-27

## 2022-10-13 RX ORDER — EZETIMIBE 10 MG/1
10 TABLET ORAL DAILY
Qty: 90 TABLET | Refills: 3 | Status: SHIPPED | OUTPATIENT
Start: 2022-10-13 | End: 2023-11-27

## 2022-10-13 RX ORDER — METOPROLOL SUCCINATE 25 MG/1
25 TABLET, EXTENDED RELEASE ORAL 2 TIMES DAILY
Qty: 180 TABLET | Refills: 3 | Status: SHIPPED | OUTPATIENT
Start: 2022-10-13 | End: 2023-11-27

## 2022-10-13 NOTE — LETTER
10/13/2022    Cuco Singh MD  [facility] [address] [facility] [address]    RE: Maico Smith       Dear Colleague,     I had the pleasure of seeing Maico Smith in the [facility].  HPI and Plan:   I had the pleasure of seeing Maico Smith in cardiology clinic in follow-up.    I had the pleasure of seeing Maico Smith in Cardiology Clinic today, a 72-year-old male with past medical history of coronary disease with previous bypass surgery in 2009 who returns for a followup.  He had LIMA to the LAD, vein graft to the posterior descending artery and vein graft to the obtuse marginal branch.    He is doing well.  He is limited by his right knee pain and is undergoing knee surgery in the near future.  He wanted cardiac clearance regarding that.  We did a recent Lexiscan stress nuclear study which showed nontransmural inferior and inferolateral scar without ischemia.  Ejection fraction after stress was 61%.    Overall, he has done well from cardiac perspective.  He has no chest pain or shortness of breath.  He is trying to lose weight.  He still has opportunity to improve his diet.  He still does not completely eliminate saturated fats.    He had labs done in July with his primary care physician.  His LDL was 82.  Last year the LDL was 79.  He is on rosuvastatin 40 mg as well as ezetimibe 10 mg daily.  We did refill his cardiac meds.  AST was 32 and ALT 29       IMPRESSION:   1.  Coronary artery disease, stable with no angina.  I would continue aspirin, metoprolol XL 25 mg b.i.d. and some nitro on a p.r.n. basis.  I have encouraged him to continue exercise as able.      2.  Cardiac clearance for upcoming knee surgery  Regarding cardiac clearance, given the stress test revealed no significant ischemia, absence of any chest pain, I would suggest he can proceed with surgery with low perioperative cardiac risk.  Given that he has underlying heart condition previous bypass surgery, it  may be best done in hospital setting.  I explained that to him but the final call would be his surgeons and the anesthesia team.     3.  Hyperlipidemia   Continue rosuvastatin and Zetia.  LDL 82.  With weight loss that he is aiming for and with dietary modification, LDL can improve to below 7070 which is our target.    He will return to see me in follow-up in a years time or earlier..    Thank you for allowing us to participate in the care of this nice patient.  We will see him back in followup in a year.     Sincerely,           Zachery Damon MD     Today's clinic visit entailed:  Review of the result(s) of each unique test - lipid profile, stress nuclear study  Prescription drug management  Provider  Link to MDM Help Grid         No orders of the defined types were placed in this encounter.      Orders Placed This Encounter   Medications     ezetimibe (ZETIA) 10 MG tablet     Sig: Take 1 tablet (10 mg) by mouth daily     Dispense:  90 tablet     Refill:  3     metoprolol succinate ER (TOPROL XL) 25 MG 24 hr tablet     Sig: Take 1 tablet (25 mg) by mouth 2 times daily     Dispense:  180 tablet     Refill:  3     rosuvastatin (CRESTOR) 40 MG tablet     Sig: Take 1 tablet (40 mg) by mouth daily     Dispense:  90 tablet     Refill:  3       Medications Discontinued During This Encounter   Medication Reason     rosuvastatin (CRESTOR) 40 MG tablet Reorder     metoprolol succinate ER (TOPROL-XL) 25 MG 24 hr tablet Reorder     ezetimibe (ZETIA) 10 MG tablet Reorder         Encounter Diagnoses   Name Primary?     Hyperlipidemia LDL goal <100      Coronary artery disease involving native coronary artery of native heart without angina pectoris        CURRENT MEDICATIONS:  Current Outpatient Medications   Medication Sig Dispense Refill     ASPIRIN 81 MG OR TABS 1 TABLET DAILY       B Complex Vitamins (VITAMIN-B COMPLEX PO) Take  by mouth. 1 tablet daily        CALCIUM 500/D OR Calcium magnesium zinc tablet, one daily        Cholecalciferol (VITAMIN D) 2000 UNIT CAPS Take  by mouth daily.       ezetimibe (ZETIA) 10 MG tablet Take 1 tablet (10 mg) by mouth daily 90 tablet 3     FISH OIL 1200 MG OR CAPS 2 tablets daily       IBUPROFEN 600 MG OR TABS 1 TABLET 4 TIMES DAILY AS NEEDED       metoprolol succinate ER (TOPROL XL) 25 MG 24 hr tablet Take 1 tablet (25 mg) by mouth 2 times daily 180 tablet 3     nitroGLYcerin (NITROSTAT) 0.4 MG sublingual tablet Place 1 tablet (0.4 mg) under the tongue every 5 minutes as needed for chest pain up to 3 tablets per episode. 25 tablet 0     rosuvastatin (CRESTOR) 40 MG tablet Take 1 tablet (40 mg) by mouth daily 90 tablet 3       ALLERGIES   No Known Allergies    PAST MEDICAL HISTORY:  Past Medical History:   Diagnosis Date     Abnormal angiogram 2009     CAD (coronary artery disease) 2009     Hyperlipaemia 2009     Myocardial infarct (H) 2009     Pleural effusion 2009    left side -drained     S/P CABG (coronary artery bypass graft) 2009       PAST SURGICAL HISTORY:  Past Surgical History:   Procedure Laterality Date     CORONARY ANGIOGRAPHY ADULT ORDER  9/14/09    Critical left main lesion with a thrombus sitting in the bifurcation of the LAD and the first septal .  Recommend  bypass grafting to the LAD, the left circumflex, and the PDA vessel.      CORONARY ARTERY BYPASS  2009     LIMA to the LAD and vein graft to the posterior descending artery and obtuse marginal branch       FAMILY HISTORY:  Family History   Problem Relation Age of Onset     Hypertension Mother      C.A.D. Mother         angioplasty     Cancer - colorectal Father      C.A.D. Father      Cancer Father         Bladder       SOCIAL HISTORY:  Social History     Socioeconomic History     Marital status:      Spouse name: None     Number of children: None     Years of education: None     Highest education level: None   Tobacco Use     Smoking status: Never     Smokeless tobacco: Never   Substance and Sexual Activity  "    Alcohol use: Yes     Comment: 2 drinks week     Drug use: No     Sexual activity: Yes     Partners: Female   Other Topics Concern      Service No     Blood Transfusions No     Caffeine Concern No     Occupational Exposure No     Hobby Hazards No     Sleep Concern No     Stress Concern No     Weight Concern Yes     Comment: weight gain     Special Diet No     Back Care Yes     Exercise Yes     Comment: Walking 3-4x/week     Seat Belt Yes     Parent/sibling w/ CABG, MI or angioplasty before 65F 55M? No       Review of Systems:  Skin:          Eyes:         ENT:         Respiratory:  Positive for sleep apnea;CPAP     Cardiovascular:  Negative;palpitations;chest pain;syncope or near-syncope;cyanosis;lightheadedness;dizziness;exercise intolerance;fatigue;edema      Gastroenterology:        Genitourinary:         Musculoskeletal:  Positive for joint pain knee surgery scheduled 11/1/22  Neurologic:         Psychiatric:         Heme/Lymph/Imm:         Endocrine:  Negative        Physical Exam:  Vitals: /84   Pulse 64   Ht 1.753 m (5' 9\")   Wt 96.1 kg (211 lb 14.4 oz)   BMI 31.29 kg/m      Regular rate and rhythm without murmurs.  Chest is clear to auscultation.    CC  No referring provider defined for this encounter.    Thank you for allowing me to participate in the care of your patient.      Sincerely,     Zachery Damon MD     [facility]  "

## 2022-10-13 NOTE — PATIENT INSTRUCTIONS
Thank you for your visit with the [facility] today.    Today's plan:   Plan to follow-up in the clinic in 1-year.  I would recommend you have your total knee replacement done in the inpatient setting as there are more resources available for you post-operatively.  Continue healthy diet changes and aim to manage your weight as means of lowering your LDL-cholesterol.      If you have questions or concerns, please do not hesitate to call my nursing support team at [phone].    Scheduling phone number: [phone]  [facility] Clinic Number: [phone]    It was a pleasure seeing you today.     WILLY Garner, CNP  Nurse Practitioner  [facility]  October 13, 2022  ________________________________________________________    Samples Given: Epiduo Forte QPM Detail Level: Zone

## 2022-10-13 NOTE — PROGRESS NOTES
HPI and Plan:   I had the pleasure of seeing Maico Smith in cardiology clinic in follow-up.    I had the pleasure of seeing Maico Smith in Cardiology Clinic today, a 72-year-old male with past medical history of coronary disease with previous bypass surgery in 2009 who returns for a followup.  He had LIMA to the LAD, vein graft to the posterior descending artery and vein graft to the obtuse marginal branch.    He is doing well.  He is limited by his right knee pain and is undergoing knee surgery in the near future.  He wanted cardiac clearance regarding that.  We did a recent Lexiscan stress nuclear study which showed nontransmural inferior and inferolateral scar without ischemia.  Ejection fraction after stress was 61%.    Overall, he has done well from cardiac perspective.  He has no chest pain or shortness of breath.  He is trying to lose weight.  He still has opportunity to improve his diet.  He still does not completely eliminate saturated fats.    He had labs done in July with his primary care physician.  His LDL was 82.  Last year the LDL was 79.  He is on rosuvastatin 40 mg as well as ezetimibe 10 mg daily.  We did refill his cardiac meds.  AST was 32 and ALT 29       IMPRESSION:   1.  Coronary artery disease, stable with no angina.  I would continue aspirin, metoprolol XL 25 mg b.i.d. and some nitro on a p.r.n. basis.  I have encouraged him to continue exercise as able.      2.  Cardiac clearance for upcoming knee surgery  Regarding cardiac clearance, given the stress test revealed no significant ischemia, absence of any chest pain, I would suggest he can proceed with surgery with low perioperative cardiac risk.  Given that he has underlying heart condition previous bypass surgery, it may be best done in hospital setting.  I explained that to him but the final call would be his surgeons and the anesthesia team.     3.  Hyperlipidemia   Continue rosuvastatin and Zetia.  LDL 82.  With weight loss that he is  aiming for and with dietary modification, LDL can improve to below 7070 which is our target.    He will return to see me in follow-up in a years time or earlier..    Thank you for allowing us to participate in the care of this nice patient.  We will see him back in followup in a year.     Sincerely,           Zachery Damon MD     Today's clinic visit entailed:  Review of the result(s) of each unique test - lipid profile, stress nuclear study  Prescription drug management  Provider  Link to [facility] Help Grid         No orders of the defined types were placed in this encounter.      Orders Placed This Encounter   Medications     ezetimibe (ZETIA) 10 MG tablet     Sig: Take 1 tablet (10 mg) by mouth daily     Dispense:  90 tablet     Refill:  3     metoprolol succinate ER (TOPROL XL) 25 MG 24 hr tablet     Sig: Take 1 tablet (25 mg) by mouth 2 times daily     Dispense:  180 tablet     Refill:  3     rosuvastatin (CRESTOR) 40 MG tablet     Sig: Take 1 tablet (40 mg) by mouth daily     Dispense:  90 tablet     Refill:  3       Medications Discontinued During This Encounter   Medication Reason     rosuvastatin (CRESTOR) 40 MG tablet Reorder     metoprolol succinate ER (TOPROL-XL) 25 MG 24 hr tablet Reorder     ezetimibe (ZETIA) 10 MG tablet Reorder         Encounter Diagnoses   Name Primary?     Hyperlipidemia LDL goal <100      Coronary artery disease involving native coronary artery of native heart without angina pectoris        CURRENT MEDICATIONS:  Current Outpatient Medications   Medication Sig Dispense Refill     ASPIRIN 81 MG OR TABS 1 TABLET DAILY       B Complex Vitamins (VITAMIN-B COMPLEX PO) Take  by mouth. 1 tablet daily        CALCIUM 500/D OR Calcium magnesium zinc tablet, one daily       Cholecalciferol (VITAMIN D) 2000 UNIT CAPS Take  by mouth daily.       ezetimibe (ZETIA) 10 MG tablet Take 1 tablet (10 mg) by mouth daily 90 tablet 3     FISH OIL 1200 MG OR CAPS 2 tablets daily       IBUPROFEN 600 MG OR TABS  1 TABLET 4 TIMES DAILY AS NEEDED       metoprolol succinate ER (TOPROL XL) 25 MG 24 hr tablet Take 1 tablet (25 mg) by mouth 2 times daily 180 tablet 3     nitroGLYcerin (NITROSTAT) 0.4 MG sublingual tablet Place 1 tablet (0.4 mg) under the tongue every 5 minutes as needed for chest pain up to 3 tablets per episode. 25 tablet 0     rosuvastatin (CRESTOR) 40 MG tablet Take 1 tablet (40 mg) by mouth daily 90 tablet 3       ALLERGIES   No Known Allergies    PAST MEDICAL HISTORY:  Past Medical History:   Diagnosis Date     Abnormal angiogram 2009     CAD (coronary artery disease) 2009     Hyperlipaemia 2009     Myocardial infarct (H) 2009     Pleural effusion 2009    left side -drained     S/P CABG (coronary artery bypass graft) 2009       PAST SURGICAL HISTORY:  Past Surgical History:   Procedure Laterality Date     CORONARY ANGIOGRAPHY ADULT ORDER  9/14/09    Critical left main lesion with a thrombus sitting in the bifurcation of the LAD and the first septal .  Recommend  bypass grafting to the LAD, the left circumflex, and the PDA vessel.      CORONARY ARTERY BYPASS  2009     LIMA to the LAD and vein graft to the posterior descending artery and obtuse marginal branch       FAMILY HISTORY:  Family History   Problem Relation Age of Onset     Hypertension Mother      C.A.D. Mother         angioplasty     Cancer - colorectal Father      C.A.D. Father      Cancer Father         Bladder       SOCIAL HISTORY:  Social History     Socioeconomic History     Marital status:      Spouse name: None     Number of children: None     Years of education: None     Highest education level: None   Tobacco Use     Smoking status: Never     Smokeless tobacco: Never   Substance and Sexual Activity     Alcohol use: Yes     Comment: 2 drinks week     Drug use: No     Sexual activity: Yes     Partners: Female   Other Topics Concern      Service No     Blood Transfusions No     Caffeine Concern No     Occupational  "Exposure No     Hobby Hazards No     Sleep Concern No     Stress Concern No     Weight Concern Yes     Comment: weight gain     Special Diet No     Back Care Yes     Exercise Yes     Comment: Walking 3-4x/week     Seat Belt Yes     Parent/sibling w/ CABG, MI or angioplasty before 65F 55M? No       Review of Systems:  Skin:          Eyes:         ENT:         Respiratory:  Positive for sleep apnea;CPAP     Cardiovascular:  Negative;palpitations;chest pain;syncope or near-syncope;cyanosis;lightheadedness;dizziness;exercise intolerance;fatigue;edema      Gastroenterology:        Genitourinary:         Musculoskeletal:  Positive for joint pain knee surgery scheduled 11/1/22  Neurologic:         Psychiatric:         Heme/Lymph/Imm:         Endocrine:  Negative        Physical Exam:  Vitals: /84   Pulse 64   Ht 1.753 m (5' 9\")   Wt 96.1 kg (211 lb 14.4 oz)   BMI 31.29 kg/m      Regular rate and rhythm without murmurs.  Chest is clear to auscultation.    CC  No referring provider defined for this encounter.              "

## 2023-04-22 ENCOUNTER — HEALTH MAINTENANCE LETTER (OUTPATIENT)
Age: 73
End: 2023-04-22

## 2023-06-02 ENCOUNTER — HEALTH MAINTENANCE LETTER (OUTPATIENT)
Age: 73
End: 2023-06-02

## 2023-11-22 DIAGNOSIS — I25.10 CORONARY ARTERY DISEASE INVOLVING NATIVE CORONARY ARTERY OF NATIVE HEART WITHOUT ANGINA PECTORIS: ICD-10-CM

## 2023-11-22 DIAGNOSIS — I25.10 CORONARY ARTERY DISEASE INVOLVING NATIVE CORONARY ARTERY OF NATIVE HEART WITHOUT ANGINA PECTORIS: Primary | ICD-10-CM

## 2023-11-22 RX ORDER — ROSUVASTATIN CALCIUM 40 MG/1
40 TABLET, COATED ORAL DAILY
Qty: 90 TABLET | Refills: 3 | OUTPATIENT
Start: 2023-11-22

## 2023-11-22 NOTE — TELEPHONE ENCOUNTER
[facility] Cardiology Refill Guideline reviewed.  Medication does not meet criteria for refill due to needs Lipids/ALT.  Messaged to providers team for further review.

## 2023-11-27 DIAGNOSIS — E78.5 HYPERLIPIDEMIA LDL GOAL <100: ICD-10-CM

## 2023-11-27 DIAGNOSIS — I25.10 CORONARY ARTERY DISEASE INVOLVING NATIVE CORONARY ARTERY OF NATIVE HEART WITHOUT ANGINA PECTORIS: ICD-10-CM

## 2023-11-27 RX ORDER — METOPROLOL SUCCINATE 25 MG/1
25 TABLET, EXTENDED RELEASE ORAL 2 TIMES DAILY
Qty: 180 TABLET | Refills: 0 | Status: SHIPPED | OUTPATIENT
Start: 2023-11-27 | End: 2024-04-26

## 2023-11-27 RX ORDER — EZETIMIBE 10 MG/1
10 TABLET ORAL DAILY
Qty: 90 TABLET | Refills: 0 | Status: SHIPPED | OUTPATIENT
Start: 2023-11-27 | End: 2024-04-26

## 2023-11-27 RX ORDER — ROSUVASTATIN CALCIUM 40 MG/1
40 TABLET, COATED ORAL DAILY
Qty: 90 TABLET | Refills: 0 | Status: SHIPPED | OUTPATIENT
Start: 2023-11-27 | End: 2024-04-26

## 2023-11-27 NOTE — PROGRESS NOTES
[facility] Cardiology Refill Guideline reviewed.  Medication meets criteria for refill. Pt will need Office visit for further refills. SANTIAGO Gregg RN

## 2024-04-26 ENCOUNTER — OFFICE VISIT (OUTPATIENT)
Dept: CARDIOLOGY | Facility: CLINIC | Age: 74
End: 2024-04-26
Payer: COMMERCIAL

## 2024-04-26 VITALS
HEART RATE: 62 BPM | DIASTOLIC BLOOD PRESSURE: 75 MMHG | BODY MASS INDEX: 33.3 KG/M2 | HEIGHT: 68 IN | WEIGHT: 219.7 LBS | SYSTOLIC BLOOD PRESSURE: 121 MMHG

## 2024-04-26 DIAGNOSIS — I25.10 CORONARY ARTERY DISEASE INVOLVING NATIVE CORONARY ARTERY OF NATIVE HEART WITHOUT ANGINA PECTORIS: Primary | ICD-10-CM

## 2024-04-26 DIAGNOSIS — E78.5 HYPERLIPIDEMIA LDL GOAL <100: ICD-10-CM

## 2024-04-26 PROCEDURE — 99213 OFFICE O/P EST LOW 20 MIN: CPT | Performed by: INTERNAL MEDICINE

## 2024-04-26 RX ORDER — METOPROLOL SUCCINATE 25 MG/1
25 TABLET, EXTENDED RELEASE ORAL 2 TIMES DAILY
Qty: 180 TABLET | Refills: 3 | Status: SHIPPED | OUTPATIENT
Start: 2024-04-26

## 2024-04-26 RX ORDER — ROSUVASTATIN CALCIUM 40 MG/1
40 TABLET, COATED ORAL DAILY
Qty: 90 TABLET | Refills: 3 | Status: SHIPPED | OUTPATIENT
Start: 2024-04-26

## 2024-04-26 RX ORDER — EZETIMIBE 10 MG/1
10 TABLET ORAL DAILY
Qty: 90 TABLET | Refills: 3 | Status: SHIPPED | OUTPATIENT
Start: 2024-04-26

## 2024-04-26 NOTE — PROGRESS NOTES
HPI and Plan:   I had the pleasure of seeing Maico Smith in cardiology clinic in follow-up.    He is a 73-year-old male with past medical history of coronary disease bypass surgery in 2009.  LIMA to the LAD vein graft to the PDA vein graft to the obtuse marginal branch at that time.  He has moved to [place] but continues to see me on an annual basis.  His last stress nuclear study in 2022 revealed no ischemia.    He had knee surgery last year and since then he is more active.  His stiffness is improved.  He is feeling better.  However has not been able to lose weight.    He is free of chest pain or shortness of breath.  No orthopnea PND or edema feet.    We reviewed his lipid profile numbers from earlier this year.  Was done by his primary care provider.  LDL was 79 compared to 82 in the past.  HDL was normal at 47.  BMP revealed normal potassium and creatinine.    On exam, regular rate and rhythm.  No murmurs.  S4 was heard.  Chest was reauscultation.  No carotid bruits.    Impression     1.  Coronary artery disease, stable with no angina.  I would continue aspirin, metoprolol XL 25 mg b.i.d. and some nitro on a p.r.n. basis.  I have encouraged him to continue exercise as able.  I have refilled his cardiac meds.      2.   Hyperlipidemia   Continue rosuvastatin and Zetia.  LDL 79.  We discussed importance of lowering intake of saturated fats and losing weight to get to target of less than 70.  He is already on rosuvastatin and Zetia.  Also his treatment dose is 40 g/day which can be continued at the same level.      He will return to see me in follow-up in a years time or earlier.     Thank you for allowing us to participate in the care of this nice patient.       Sincerely,           Zachery Damon MD        Today's clinic visit entailed:    24 minutes spent by me on the date of the encounter doing chart review, review of test results, patient visit, and documentation   Provider  Link to [facility] Help Grid         No  orders of the defined types were placed in this encounter.      No orders of the defined types were placed in this encounter.      There are no discontinued medications.      Encounter Diagnoses   Name Primary?    Hyperlipidemia LDL goal <100     Coronary artery disease involving native coronary artery of native heart without angina pectoris        CURRENT MEDICATIONS:  Current Outpatient Medications   Medication Sig Dispense Refill    ASPIRIN 81 MG OR TABS 1 TABLET DAILY      B Complex Vitamins (VITAMIN-B COMPLEX PO) Take  by mouth. 1 tablet daily       CALCIUM 500/D OR Calcium magnesium zinc tablet, one daily      Cholecalciferol (VITAMIN D) 2000 UNIT CAPS Take  by mouth daily.      ezetimibe (ZETIA) 10 MG tablet Take 1 tablet (10 mg) by mouth daily 90 tablet 0    FISH OIL 1200 MG OR CAPS 2 tablets daily      IBUPROFEN 600 MG OR TABS 1 TABLET 4 TIMES DAILY AS NEEDED      metoprolol succinate ER (TOPROL XL) 25 MG 24 hr tablet Take 1 tablet (25 mg) by mouth 2 times daily 180 tablet 0    nitroGLYcerin (NITROSTAT) 0.4 MG sublingual tablet Place 1 tablet (0.4 mg) under the tongue every 5 minutes as needed for chest pain up to 3 tablets per episode. 25 tablet 0    rosuvastatin (CRESTOR) 40 MG tablet Take 1 tablet (40 mg) by mouth daily 90 tablet 0       ALLERGIES   No Known Allergies    PAST MEDICAL HISTORY:  Past Medical History:   Diagnosis Date    Abnormal angiogram 2009    CAD (coronary artery disease) 2009    Hyperlipaemia 2009    Myocardial infarct (H) 2009    Pleural effusion 2009    left side -drained    S/P CABG (coronary artery bypass graft) 2009       PAST SURGICAL HISTORY:  Past Surgical History:   Procedure Laterality Date    CORONARY ANGIOGRAPHY ADULT ORDER  9/14/09    Critical left main lesion with a thrombus sitting in the bifurcation of the LAD and the first septal .  Recommend  bypass grafting to the LAD, the left circumflex, and the PDA vessel.     CORONARY ARTERY BYPASS  2009     LIMA to  "the LAD and vein graft to the posterior descending artery and obtuse marginal branch       FAMILY HISTORY:  Family History   Problem Relation Age of Onset    Hypertension Mother     C.A.D. Mother         angioplasty    Cancer - colorectal Father     C.A.D. Father     Cancer Father         Bladder       SOCIAL HISTORY:  Social History     Socioeconomic History    Marital status:      Spouse name: None    Number of children: None    Years of education: None    Highest education level: None   Tobacco Use    Smoking status: Never    Smokeless tobacco: Never   Substance and Sexual Activity    Alcohol use: Yes     Comment: 2 drinks week    Drug use: No    Sexual activity: Yes     Partners: Female   Other Topics Concern     Service No    Blood Transfusions No    Caffeine Concern No    Occupational Exposure No    Hobby Hazards No    Sleep Concern No    Stress Concern No    Weight Concern Yes     Comment: weight gain    Special Diet No    Back Care Yes    Exercise Yes     Comment: Walking 3-4x/week    Seat Belt Yes    Parent/sibling w/ CABG, MI or angioplasty before 65F 55M? No       Review of Systems:  Skin:          Eyes:         ENT:         Respiratory:  Positive for sleep apnea;CPAP     Cardiovascular:  Negative;palpitations;chest pain;syncope or near-syncope;dizziness;lightheadedness;cyanosis;exercise intolerance;fatigue;edema      Gastroenterology:        Genitourinary:         Musculoskeletal:         Neurologic:         Psychiatric:         Heme/Lymph/Imm:         Endocrine:  Negative        Physical Exam:  Vitals: /75   Pulse 62   Ht 1.727 m (5' 8\")   Wt 99.7 kg (219 lb 11.2 oz)   BMI 33.41 kg/m          CC  No referring provider defined for this encounter.                "

## 2024-04-26 NOTE — LETTER
4/26/2024    Cuco Singh MD  [address] [facility] [id number]    RE: Maico Smith       Dear Colleague,     I had the pleasure of seeing Maico Smith in the [facility].  HPI and Plan:   I had the pleasure of seeing Maico Smith in cardiology clinic in follow-up.    He is a 73-year-old male with past medical history of coronary disease bypass surgery in 2009.  LIMA to the LAD vein graft to the PDA vein graft to the obtuse marginal branch at that time.  He has moved to [place] but continues to see me on an annual basis.  His last stress nuclear study in 2022 revealed no ischemia.    He had knee surgery last year and since then he is more active.  His stiffness is improved.  He is feeling better.  However has not been able to lose weight.    He is free of chest pain or shortness of breath.  No orthopnea PND or edema feet.    We reviewed his lipid profile numbers from earlier this year.  Was done by his primary care provider.  LDL was 79 compared to 82 in the past.  HDL was normal at 47.  BMP revealed normal potassium and creatinine.    On exam, regular rate and rhythm.  No murmurs.  S4 was heard.  Chest was reauscultation.  No carotid bruits.    Impression     1.  Coronary artery disease, stable with no angina.  I would continue aspirin, metoprolol XL 25 mg b.i.d. and some nitro on a p.r.n. basis.  I have encouraged him to continue exercise as able.  I have refilled his cardiac meds.      2.   Hyperlipidemia   Continue rosuvastatin and Zetia.  LDL 79.  We discussed importance of lowering intake of saturated fats and losing weight to get to target of less than 70.  He is already on rosuvastatin and Zetia.  Also his treatment dose is 40 g/day which can be continued at the same level.      He will return to see me in follow-up in a years time or earlier.     Thank you for allowing us to participate in the care of this nice patient.       Sincerely,           Zachery Damon MD         Today's clinic visit entailed:    24 minutes spent by me on the date of the encounter doing chart review, review of test results, patient visit, and documentation   Provider  Link to MDM Help Grid         No orders of the defined types were placed in this encounter.      No orders of the defined types were placed in this encounter.      There are no discontinued medications.      Encounter Diagnoses   Name Primary?    Hyperlipidemia LDL goal <100     Coronary artery disease involving native coronary artery of native heart without angina pectoris        CURRENT MEDICATIONS:  Current Outpatient Medications   Medication Sig Dispense Refill    ASPIRIN 81 MG OR TABS 1 TABLET DAILY      B Complex Vitamins (VITAMIN-B COMPLEX PO) Take  by mouth. 1 tablet daily       CALCIUM 500/D OR Calcium magnesium zinc tablet, one daily      Cholecalciferol (VITAMIN D) 2000 UNIT CAPS Take  by mouth daily.      ezetimibe (ZETIA) 10 MG tablet Take 1 tablet (10 mg) by mouth daily 90 tablet 0    FISH OIL 1200 MG OR CAPS 2 tablets daily      IBUPROFEN 600 MG OR TABS 1 TABLET 4 TIMES DAILY AS NEEDED      metoprolol succinate ER (TOPROL XL) 25 MG 24 hr tablet Take 1 tablet (25 mg) by mouth 2 times daily 180 tablet 0    nitroGLYcerin (NITROSTAT) 0.4 MG sublingual tablet Place 1 tablet (0.4 mg) under the tongue every 5 minutes as needed for chest pain up to 3 tablets per episode. 25 tablet 0    rosuvastatin (CRESTOR) 40 MG tablet Take 1 tablet (40 mg) by mouth daily 90 tablet 0       ALLERGIES   No Known Allergies    PAST MEDICAL HISTORY:  Past Medical History:   Diagnosis Date    Abnormal angiogram 2009    CAD (coronary artery disease) 2009    Hyperlipaemia 2009    Myocardial infarct (H) 2009    Pleural effusion 2009    left side -drained    S/P CABG (coronary artery bypass graft) 2009       PAST SURGICAL HISTORY:  Past Surgical History:   Procedure Laterality Date    CORONARY ANGIOGRAPHY ADULT ORDER  9/14/09    Critical left main lesion  "with a thrombus sitting in the bifurcation of the LAD and the first septal .  Recommend  bypass grafting to the LAD, the left circumflex, and the PDA vessel.     CORONARY ARTERY BYPASS  2009     LIMA to the LAD and vein graft to the posterior descending artery and obtuse marginal branch       FAMILY HISTORY:  Family History   Problem Relation Age of Onset    Hypertension Mother     C.A.D. Mother         angioplasty    Cancer - colorectal Father     C.A.D. Father     Cancer Father         Bladder       SOCIAL HISTORY:  Social History     Socioeconomic History    Marital status:      Spouse name: None    Number of children: None    Years of education: None    Highest education level: None   Tobacco Use    Smoking status: Never    Smokeless tobacco: Never   Substance and Sexual Activity    Alcohol use: Yes     Comment: 2 drinks week    Drug use: No    Sexual activity: Yes     Partners: Female   Other Topics Concern     Service No    Blood Transfusions No    Caffeine Concern No    Occupational Exposure No    Hobby Hazards No    Sleep Concern No    Stress Concern No    Weight Concern Yes     Comment: weight gain    Special Diet No    Back Care Yes    Exercise Yes     Comment: Walking 3-4x/week    Seat Belt Yes    Parent/sibling w/ CABG, MI or angioplasty before 65F 55M? No       Review of Systems:  Skin:          Eyes:         ENT:         Respiratory:  Positive for sleep apnea;CPAP     Cardiovascular:  Negative;palpitations;chest pain;syncope or near-syncope;dizziness;lightheadedness;cyanosis;exercise intolerance;fatigue;edema      Gastroenterology:        Genitourinary:         Musculoskeletal:         Neurologic:         Psychiatric:         Heme/Lymph/Imm:         Endocrine:  Negative        Physical Exam:  Vitals: /75   Pulse 62   Ht 1.727 m (5' 8\")   Wt 99.7 kg (219 lb 11.2 oz)   BMI 33.41 kg/m          CC  No referring provider defined for this encounter.      Thank you for " allowing me to participate in the care of your patient.      Sincerely,     Zachery Damon MD     [facility]

## 2025-03-08 ENCOUNTER — HEALTH MAINTENANCE LETTER (OUTPATIENT)
Age: 75
End: 2025-03-08

## (undated) RX ORDER — REGADENOSON 0.08 MG/ML
INJECTION, SOLUTION INTRAVENOUS
Status: DISPENSED
Start: 2022-10-04